# Patient Record
Sex: FEMALE | Race: WHITE | Employment: UNEMPLOYED | URBAN - METROPOLITAN AREA
[De-identification: names, ages, dates, MRNs, and addresses within clinical notes are randomized per-mention and may not be internally consistent; named-entity substitution may affect disease eponyms.]

---

## 2018-12-17 PROBLEM — O09.299 HISTORY OF PRIOR PREGNANCY WITH SHORT CERVIX, CURRENTLY PREGNANT: Status: ACTIVE | Noted: 2018-12-17

## 2018-12-17 PROBLEM — O26.899 RH NEGATIVE STATE IN ANTEPARTUM PERIOD: Status: ACTIVE | Noted: 2018-12-17

## 2018-12-17 PROBLEM — Z87.51 HISTORY OF PRETERM DELIVERY: Status: ACTIVE | Noted: 2018-12-17

## 2018-12-17 PROBLEM — Z67.91 RH NEGATIVE STATE IN ANTEPARTUM PERIOD: Status: ACTIVE | Noted: 2018-12-17

## 2018-12-17 PROBLEM — O09.529 ADVANCED MATERNAL AGE IN MULTIGRAVIDA: Status: ACTIVE | Noted: 2018-12-17

## 2019-01-17 PROBLEM — Z23 ENCOUNTER FOR IMMUNIZATION: Status: ACTIVE | Noted: 2019-01-17

## 2019-03-04 PROBLEM — O26.879 SHORT CERVIX AFFECTING PREGNANCY: Status: ACTIVE | Noted: 2019-03-04

## 2019-03-04 PROBLEM — O09.892 H/O PRETERM DELIVERY, CURRENTLY PREGNANT, SECOND TRIMESTER: Status: ACTIVE | Noted: 2018-12-17

## 2019-03-04 PROBLEM — O09.522 ELDERLY MULTIGRAVIDA IN SECOND TRIMESTER: Status: ACTIVE | Noted: 2018-12-17

## 2019-05-21 ENCOUNTER — HOSPITAL ENCOUNTER (OUTPATIENT)
Age: 39
Setting detail: OBSERVATION
LOS: 1 days | Discharge: HOME | End: 2019-05-23
Attending: OBSTETRICS & GYNECOLOGY | Admitting: OBSTETRICS & GYNECOLOGY
Payer: COMMERCIAL

## 2019-05-21 DIAGNOSIS — O09.299 HISTORY OF PRIOR PREGNANCY WITH SHORT CERVIX, CURRENTLY PREGNANT: ICD-10-CM

## 2019-05-21 DIAGNOSIS — O09.892 H/O PRETERM DELIVERY, CURRENTLY PREGNANT, SECOND TRIMESTER: ICD-10-CM

## 2019-05-21 DIAGNOSIS — E03.9 HYPOTHYROIDISM AFFECTING PREGNANCY IN THIRD TRIMESTER: ICD-10-CM

## 2019-05-21 DIAGNOSIS — O99.283 HYPOTHYROIDISM AFFECTING PREGNANCY IN THIRD TRIMESTER: ICD-10-CM

## 2019-05-21 DIAGNOSIS — O09.522 ELDERLY MULTIGRAVIDA IN SECOND TRIMESTER: ICD-10-CM

## 2019-05-21 DIAGNOSIS — O60.03 PRETERM LABOR IN THIRD TRIMESTER WITHOUT DELIVERY: ICD-10-CM

## 2019-05-21 DIAGNOSIS — Z23 ENCOUNTER FOR IMMUNIZATION: ICD-10-CM

## 2019-05-21 PROBLEM — O26.879 CERVIX, SHORT (AFFECTING PREGNANCY): Status: ACTIVE | Noted: 2019-05-21

## 2019-05-21 PROBLEM — O60.00 PRETERM LABOR: Status: ACTIVE | Noted: 2019-05-21

## 2019-05-21 LAB
A1 MICROGLOB PLACENTAL VAG QL: NEGATIVE
CONTROL LINE PRESENT?: NORMAL
EXPIRATION DATE: NORMAL
INTERNAL NEGATIVE CONTROL: NORMAL
KIT LOT NO.: NORMAL

## 2019-05-21 PROCEDURE — 99218 HC RM OBSERVATION: CPT

## 2019-05-21 PROCEDURE — 96372 THER/PROPH/DIAG INJ SC/IM: CPT

## 2019-05-21 PROCEDURE — 99285 EMERGENCY DEPT VISIT HI MDM: CPT

## 2019-05-21 PROCEDURE — 59025 FETAL NON-STRESS TEST: CPT

## 2019-05-21 PROCEDURE — 65270000029 HC RM PRIVATE

## 2019-05-21 PROCEDURE — 74011250636 HC RX REV CODE- 250/636: Performed by: OBSTETRICS & GYNECOLOGY

## 2019-05-21 PROCEDURE — 84112 EVAL AMNIOTIC FLUID PROTEIN: CPT | Performed by: OBSTETRICS & GYNECOLOGY

## 2019-05-21 RX ORDER — ZOLPIDEM TARTRATE 5 MG/1
5 TABLET ORAL
Status: DISCONTINUED | OUTPATIENT
Start: 2019-05-21 | End: 2019-05-23 | Stop reason: HOSPADM

## 2019-05-21 RX ORDER — SODIUM CHLORIDE 0.9 % (FLUSH) 0.9 %
5-40 SYRINGE (ML) INJECTION AS NEEDED
Status: DISCONTINUED | OUTPATIENT
Start: 2019-05-21 | End: 2019-05-23 | Stop reason: HOSPADM

## 2019-05-21 RX ORDER — BETAMETHASONE SODIUM PHOSPHATE AND BETAMETHASONE ACETATE 3; 3 MG/ML; MG/ML
12 INJECTION, SUSPENSION INTRA-ARTICULAR; INTRALESIONAL; INTRAMUSCULAR; SOFT TISSUE EVERY 24 HOURS
Status: COMPLETED | OUTPATIENT
Start: 2019-05-21 | End: 2019-05-22

## 2019-05-21 RX ORDER — ONDANSETRON 4 MG/1
4 TABLET, ORALLY DISINTEGRATING ORAL
Status: DISCONTINUED | OUTPATIENT
Start: 2019-05-21 | End: 2019-05-23 | Stop reason: HOSPADM

## 2019-05-21 RX ORDER — SODIUM CHLORIDE 0.9 % (FLUSH) 0.9 %
5-40 SYRINGE (ML) INJECTION EVERY 8 HOURS
Status: DISCONTINUED | OUTPATIENT
Start: 2019-05-21 | End: 2019-05-23 | Stop reason: HOSPADM

## 2019-05-21 RX ORDER — ACETAMINOPHEN 325 MG/1
650 TABLET ORAL
Status: DISCONTINUED | OUTPATIENT
Start: 2019-05-21 | End: 2019-05-23 | Stop reason: HOSPADM

## 2019-05-21 RX ADMIN — BETAMETHASONE SODIUM PHOSPHATE AND BETAMETHASONE ACETATE 12 MG: 3; 3 INJECTION, SUSPENSION INTRA-ARTICULAR; INTRALESIONAL; INTRAMUSCULAR at 11:11

## 2019-05-21 NOTE — PROGRESS NOTES
11:11 Medication Given betamethasone (CELESTONE) injection 12 mg -  Dose: 12 mg ; Route: IntraMUSCular ; Site: Left Upper Outer Quadrant ; Scheduled Time: Olimpia Corrales, Malena Mac RN

## 2019-05-21 NOTE — H&P
Chief Complaint   Patient presents with    Pregnancy Problem     30w2d       46 y.o. female  at 30w2d  weeks gestation and a chief complaint of   Chief Complaint   Patient presents with    Pregnancy Problem     30w2d    who requests evaluation for vaginal discharge and possible leakage of fluid yesterday and today. No vb or regular contractions. No uti sx. Other symptoms are: some mild cramping occasionally. Duration of symptoms:1 day    Alleviating factors: lying down  Exacerbating factors: standing--is on progesterone and had seen mfm. Also ama.   Her pregnancy issues include: short cervix    Fetal movement has beennormal .    HISTORY:  OB History    Para Term  AB Living   5 4 3 1 0 4   SAB TAB Ectopic Molar Multiple Live Births   0 0 0 0 0 4      # Outcome Date GA Lbr Pino/2nd Weight Sex Delivery Anes PTL Lv   5 Current            4 Term 03/05/15 37w2d  2.665 kg M  EPIDURAL AN N RAFAEL   3 Term 13 37w0d  2.665 kg F Vag-Spont  N RAFAEL      Birth Comments: [place]   2  02/17/10 35w0d  2.268 kg M Vag-Spont  Y RAFAEL      Birth Comments: [place]   1 Term 08 38w0d  3.09 kg F Vag-Spont  N RAFAEL      Birth Comments: [place] 20 History     Substance and Sexual Activity   Sexual Activity Yes    Partners: Male    Birth control/protection: None       Social History     Socioeconomic History    Marital status:      Spouse name: Not on file    Number of children: Not on file    Years of education: Not on file    Highest education level: Not on file   Occupational History    Not on file   Social Needs    Financial resource strain: Not on file    Food insecurity:     Worry: Not on file     Inability: Not on file    Transportation needs:     Medical: Not on file     Non-medical: Not on file   Tobacco Use    Smoking status: Never Smoker    Smokeless tobacco: Never Used   Substance and Sexual Activity    Alcohol use: No    Drug use: No    Sexual activity: Yes Partners: Male     Birth control/protection: None   Lifestyle    Physical activity:     Days per week: Not on file     Minutes per session: Not on file    Stress: Not on file   Relationships    Social connections:     Talks on phone: Not on file     Gets together: Not on file     Attends Mandaeism service: Not on file     Active member of club or organization: Not on file     Attends meetings of clubs or organizations: Not on file     Relationship status: Not on file    Intimate partner violence:     Fear of current or ex partner: Not on file     Emotionally abused: Not on file     Physically abused: Not on file     Forced sexual activity: Not on file   Other Topics Concern     Service Not Asked    Blood Transfusions Not Asked    Caffeine Concern Not Asked    Occupational Exposure Not Asked    Hobby Hazards Not Asked    Sleep Concern Not Asked    Stress Concern Not Asked    Weight Concern Not Asked    Special Diet Not Asked    Back Care Not Asked    Exercise Not Asked    Bike Helmet Not Asked    [facility] Not Asked    Self-Exams Not Asked   Social History Narrative    Not on file       Past Surgical History:   Procedure Laterality Date    HX APPENDECTOMY         Past Medical History:   Diagnosis Date    Abnormal Pap smear     x2, then normal after retested    Abnormal Papanicolaou smear of cervix     Anemia     denies hx of blood transfusions    Anxiety     GERD (gastroesophageal reflux disease)     omeprazole as needed    HX OTHER MEDICAL     appendectomy     Hypothyroidism     Intractable migraine with aura without status migrainosus 10/1/2015    Positive H. pylori test 10/1/2015     delivery     Rh negative state in antepartum period     Thyroid activity decreased          ROS:  Negative:   negative 10 point ROS except as noted in HPI    Positive:   per hpi    PHYSICAL EXAM:  Blood pressure 104/57, pulse 84, temperature 98.5 °F (36.9 °C), last menstrual period 10/21/2018, not currently breastfeeding. The patient appears well, alert, oriented x 3. Appropriate affect. Lungs are clear. Heart RRR, no murmurs. Abdomen soft, non-tender, no rebound/guarding. Fundus soft and non tender  Skin warm, dry, no rashes  Ext no edema, DTR's normal  SSE: no fluid seen, nitrazine negative  Cervix: 50%/1.5/-2    Fetal Heart Rate: Reactive  Baseline: 150 per minute  Variability: moderate  Accelerations: yes  Decelerations: none  Uterine contractions: none     Recent Results (from the past 24 hour(s))   RUPTURE OF FETAL MEMBRANES, POC    Collection Time: 05/21/19 10:15 AM   Result Value Ref Range    Rupture of fetal membrane Negative Negative    Control line present? Acceptable     Internal negative control Acceptable     Kit Lot No. [id number]     Expiration date 07/12/21        Tests performed:   UA: normal   Amniosure: negative       I have personally reviewed the patient's history, prenatal record, and pertinent test results. previous provider notes support my clinical impression. Assessment:  46 y.o. female at 30w2d  vaginal discharge, dilated cervix with  no previous exam. not ruptured. Plan:  Admit for steroids and observation. Check ffn tomorrow if stable prior to d/c. Discussed with kalpesh bee and mandi.      Signed By:  Ingrid Anderson MD     May 21, 2019

## 2019-05-21 NOTE — PROGRESS NOTES
Pt to [facility] with c/o vaginal leaking of fluid. Denied vaginal bleeding and contractions. Reports she has a cold and she thinks the leaking happened when coughing. + FM noted.  Placed on EFM and toco.

## 2019-05-22 LAB — FIBRONECTIN FETAL VAG QL: NEGATIVE

## 2019-05-22 PROCEDURE — 96372 THER/PROPH/DIAG INJ SC/IM: CPT

## 2019-05-22 PROCEDURE — 74011250636 HC RX REV CODE- 250/636: Performed by: OBSTETRICS & GYNECOLOGY

## 2019-05-22 PROCEDURE — 59025 FETAL NON-STRESS TEST: CPT

## 2019-05-22 PROCEDURE — 82731 ASSAY OF FETAL FIBRONECTIN: CPT

## 2019-05-22 PROCEDURE — 99218 HC RM OBSERVATION: CPT

## 2019-05-22 PROCEDURE — 59025 FETAL NON-STRESS TEST: CPT | Performed by: OBSTETRICS & GYNECOLOGY

## 2019-05-22 RX ADMIN — BETAMETHASONE SODIUM PHOSPHATE AND BETAMETHASONE ACETATE 12 MG: 3; 3 INJECTION, SUSPENSION INTRA-ARTICULAR; INTRALESIONAL; INTRAMUSCULAR at 11:14

## 2019-05-22 NOTE — PROGRESS NOTES
JOAQUINN collected and sent to lab. SVE 3/50/-2. Dr. Maame Collado made aware and discharge cancelled. Orders to continue contraction monitoring.

## 2019-05-22 NOTE — DISCHARGE SUMMARY
[facility] [address] [place] [address] Discharge Summary     Patient ID:  Taj Gonzalez  [id number]  75 y.o.  1980    Admit date: 2019    Discharge date and time: No discharge date for patient encounter. Admitting Physician: Vitaly Garza MD     Discharge Physician: Jeanette Dexter M.D. Admission Diagnoses: Cervix, short (affecting pregnancy) [O26.879]    Problem List: Hospital - Principal Problem:     labor (2019)    Active Problems:    Cervix, short (affecting pregnancy) (2019)     ; Other -   Patient Active Problem List   Diagnosis Code    Hypothyroidism affecting pregnancy in second trimester O99.282, E03.9    H/O  delivery, currently pregnant, second trimester O09.212    History of prior pregnancy with short cervix, currently pregnant O09.299    Elderly multigravida in second trimester O09.522    Rh negative state in antepartum period O26.899, Z67.91    Encounter for immunization Z23    Short cervix affecting pregnancy O26.879    Cervix, short (affecting pregnancy) O26.879     labor O60.00        Discharge Diagnoses: Cervix, short (affecting pregnancy) [O26.879]    Hospital Course: Taj Gonzalez had unremarkeable progressive recovery. , Eating, ambulating, and voiding in a routine manner. and Hospital course complicated by PTL    Significant Diagnostic Studies: No results found for this or any previous visit (from the past 24 hour(s)). Procedures: tocolysis, steroid administration    Discharge Exam:  Visit Vitals  /62 (BP 1 Location: Right arm, BP Patient Position: At rest)   Pulse 89   Temp 97.9 °F (36.6 °C)   Resp 18   LMP 10/21/2018   Breastfeeding? No        Heart: regular rate and rhythm, S1, S2 normal, no murmur, click, rub or gallop  Lungs:clear to auscultation bilaterally  Extremities: normal, atraumatic, no cyanosis or edema.  No DVT  Ffn done today before d/c  Cerivcal exam no change  Patient Instructions:   Current Discharge Medication List CONTINUE these medications which have NOT CHANGED    Details   levothyroxine (SYNTHROID) 75 mcg tablet TAKE 1 TABLET BY MOUTH DAILY BEFORE BREAKFAST  Qty: 90 Tab, Refills: 1    Associated Diagnoses: Hypothyroidism due to acquired atrophy of thyroid      aspirin delayed-release 81 mg tablet Take  by mouth daily. calcium carbonate-mag hydroxid 1,000-200 mg chew Take  by mouth. cholecalciferol, vitamin D3, (VITAMIN D3 PO) Take  by mouth. PNV No12-Iron-FA-DSS-OM-3 29 mg iron-1 mg -50 mg CPKD Take  by mouth. HEMOCYTE-PLUS 106 mg iron- 1 mg cap TAKE ONE CAPSULE BY MOUTH DAILY  Qty: 90 Cap, Refills: 3    Associated Diagnoses: Iron deficiency anemia, unspecified iron deficiency anemia type      omeprazole (PRILOSEC) 20 mg capsule Take 20 mg by mouth daily.           EFM no deceld  Activity: physical activity is restricted per discharge instructions  Diet: resume normal diet      Follow-up with Ed[facility] in 1 week  Signed:  Bill Carballo MD  5/22/2019  1:40 PM

## 2019-05-22 NOTE — PROGRESS NOTES
11:14 Medication Given betamethasone (CELESTONE) injection 12 mg -  Dose: 12 mg ; Route: IntraMUSCular ; Site: Left Upper Outer Quadrant ; Scheduled Time: Olimpia Corrales, Lula Carrillo RN

## 2019-05-22 NOTE — PROGRESS NOTES
Shift assessment completed. Vitals wnl, see flowsheet. Pt denies any pain at this time, pt states that she had small amount of mucousy discharge last time she went to the bathroom, but denies any uc's or pain. Pt eating dinner, will do NST after dinner. Pt verbalizes understanding.

## 2019-05-22 NOTE — PROGRESS NOTES
Shift assessment complete no complaints denies any contractions at this time reports good fetal movement.

## 2019-05-22 NOTE — PROGRESS NOTES
05/22/19 1105   Fetal Vital Signs   Mode External   Fetal Heart Rate 120   Fetal Activity Present   Variability 6-25 BPM   Decelerations None   Accelerations Yes   RN Reviewed Strip?  Yes   Non Stress Test Reactive  (laine for GA)   Uterine Activity   Mode External   Frequency (min) 0

## 2019-05-22 NOTE — PROGRESS NOTES
AM assessment complete at this time per flow sheet. Vital signs stable. No contractions tracing on the monitor. Patient denies pain,  nausea and vomiting, contractions, vaginal bleeding, vaginal leaking of fluid and visual disturbances. Patient states positive fetal movement. Encouraged to call as needed. Family at bedside.

## 2019-05-23 VITALS
DIASTOLIC BLOOD PRESSURE: 55 MMHG | TEMPERATURE: 98 F | HEART RATE: 81 BPM | SYSTOLIC BLOOD PRESSURE: 121 MMHG | RESPIRATION RATE: 18 BRPM

## 2019-05-23 PROCEDURE — 99218 HC RM OBSERVATION: CPT

## 2019-05-23 PROCEDURE — 99215 OFFICE O/P EST HI 40 MIN: CPT | Performed by: OBSTETRICS & GYNECOLOGY

## 2019-05-23 PROCEDURE — 74011250637 HC RX REV CODE- 250/637: Performed by: OBSTETRICS & GYNECOLOGY

## 2019-05-23 PROCEDURE — 59025 FETAL NON-STRESS TEST: CPT | Performed by: OBSTETRICS & GYNECOLOGY

## 2019-05-23 PROCEDURE — 59025 FETAL NON-STRESS TEST: CPT

## 2019-05-23 RX ORDER — GUAIFENESIN/DEXTROMETHORPHAN 100-10MG/5
5 SYRUP ORAL
Status: DISCONTINUED | OUTPATIENT
Start: 2019-05-23 | End: 2019-05-23 | Stop reason: HOSPADM

## 2019-05-23 RX ORDER — GUAIFENESIN/DEXTROMETHORPHAN 100-10MG/5
SYRUP ORAL
Status: DISCONTINUED
Start: 2019-05-23 | End: 2019-05-23 | Stop reason: HOSPADM

## 2019-05-23 RX ADMIN — GUAIFENESIN AND DEXTROMETHORPHAN 5 ML: 100; 10 SYRUP ORAL at 01:14

## 2019-05-23 NOTE — DISCHARGE INSTRUCTIONS
Patient Education        Pregnancy Precautions: Care Instructions  Your Care Instructions    There is no sure way to prevent labor before your due date ( labor) or to prevent most other pregnancy problems. But there are things you can do to increase your chances of a healthy pregnancy. Go to your appointments, follow your doctor's advice, and take good care of yourself. Eat well, and exercise (if your doctor agrees). And make sure to drink plenty of water. Follow-up care is a key part of your treatment and safety. Be sure to make and go to all appointments, and call your doctor if you are having problems. It's also a good idea to know your test results and keep a list of the medicines you take. How can you care for yourself at home? · Make sure you go to your prenatal appointments. At each visit, your doctor will check your blood pressure. Your doctor will also check to see if you have protein in your urine. High blood pressure and protein in urine are signs of preeclampsia. This condition can be dangerous for you and your baby. · Drink plenty of fluids, enough so that your urine is light yellow or clear like water. Dehydration can cause contractions. If you have kidney, heart, or liver disease and have to limit fluids, talk with your doctor before you increase the amount of fluids you drink. · Tell your doctor right away if you notice any symptoms of an infection, such as:  ? Burning when you urinate. ? A foul-smelling discharge from your vagina. ? Vaginal itching. ? Unexplained fever. ? Unusual pain or soreness in your uterus or lower belly. · Eat a balanced diet. Include plenty of foods that are high in calcium and iron. ? Foods high in calcium include milk, cheese, yogurt, almonds, and broccoli. ? Foods high in iron include red meat, shellfish, poultry, eggs, beans, raisins, whole-grain bread, and leafy green vegetables. · Do not smoke.  If you need help quitting, talk to your doctor about stop-smoking programs and medicines. These can increase your chances of quitting for good. · Do not drink alcohol or use illegal drugs. · Follow your doctor's directions about activity. Your doctor will let you know how much, if any, exercise you can do. · Ask your doctor if you can have sex. If you are at risk for early labor, your doctor may ask you to not have sex. · Take care to prevent falls. During pregnancy, your joints are loose, and your balance is off. Sports such as bicycling, skiing, or in-line skating can increase your risk of falling. And don't ride horses or motorcycles, dive, water ski, scuba dive, or parachute jump while you are pregnant. · Avoid getting very hot. Do not use saunas or hot tubs. Avoid staying out in the sun in hot weather for long periods. Take acetaminophen (Tylenol) to lower a high fever. · Do not take any over-the-counter or herbal medicines or supplements without talking to your doctor or pharmacist first.  When should you call for help? Call 911 anytime you think you may need emergency care. For example, call if:    · You passed out (lost consciousness).     · You have severe vaginal bleeding.     · You have severe pain in your belly or pelvis.     · You have had fluid gushing or leaking from your vagina and you know or think the umbilical cord is bulging into your vagina. If this happens, immediately get down on your knees so your rear end (buttocks) is higher than your head. This will decrease the pressure on the cord until help arrives.   [facility] your doctor now or seek immediate medical care if:    · You have signs of preeclampsia, such as:  ? Sudden swelling of your face, hands, or feet. ? New vision problems (such as dimness or blurring). ? A severe headache.     · You have any vaginal bleeding.     · You have belly pain or cramping.     · You have a fever.     · You have had regular contractions (with or without pain) for an hour.  This means that you have 8 or more within 1 hour or 4 or more in 20 minutes after you change your position and drink fluids.     · You have a sudden release of fluid from your vagina.     · You have low back pain or pelvic pressure that does not go away.     · You notice that your baby has stopped moving or is moving much less than normal.    Watch closely for changes in your health, and be sure to contact your doctor if you have any problems. Where can you learn more? Go to http://dann-avtar.info/. Enter [id number] in the search box to learn more about \"Pregnancy Precautions: Care Instructions. \"  Current as of: September 5, 2018  Content Version: 11.9  © 4850-3598 [employer], [employer]. Care instructions adapted under license by [employer] (which disclaims liability or warranty for this information). If you have questions about a medical condition or this instruction, always ask your healthcare professional. [address] any warranty or liability for your use of this information.

## 2019-05-23 NOTE — PROGRESS NOTES
05/23/19 1208   Fetal Vital Signs   Mode External   Fetal Heart Rate 130   Fetal Activity Present   Variability 6-25 BPM   Decelerations None   Accelerations Yes   RN Reviewed Strip?  Yes   Non Stress Test Reactive   Uterine Activity   Mode External   Frequency (min) 0

## 2019-05-23 NOTE — PROCEDURES
NST: 5/22/19 pm  · Indications:   PTL  · Time On:  2108  · Time Off:  2130  · Results:  Reactive  · FHR Baseline Rate:  125  · Periodic Changes:   Mod brandie, no decels  · Comments:  No uterine activity  Repeat:  As clinically indicated

## 2019-05-23 NOTE — PROGRESS NOTES
Pt resting in bed on C-TOCO. No contractions seen and pt also denied having any contractions. +FM. AM assessment complete. VSS. Deneid LOF, vaginal bleeding, headache, N/V. Pt report feeling fine. MFM to consult today. Encouraged to call with needs. Family at bedside.

## 2019-05-23 NOTE — CONSULTS
Maternal Fetal Medicine Consult Note    Requesting KIA Collado    Chief Complaint:  Pregnancy and vaginal leaking of fluid . History of Present Illness: 46 y.o.  at 30w4d by Estimated Date of Delivery: 19. Patient admitted for cervical change with history of  delivery. She complains of 2 episodes of clear LOF not associated with contractions. No VB. +FM. Negative work up for PPROM on admission. Since admission 2 episodes of small clear discharge. Patient denies HA, abdominal pain, vision changes. No regular contractions, large LOF, VB. Good FM. Minimal edema. No chest pain or shortness of breath. No significant reflux, nausea, constipation, or other GI complaints. Initially 1-2cm/50% on admission. Called 3cm/50% yesterday. No ctx ever noted on toco, irritability initially with admission. None of significance since. Review of Systems: A comprehensive review of systems was negative except for that written in the HPI.      OB History    Para Term  AB Living   5 4 3 1 0 4   SAB TAB Ectopic Molar Multiple Live Births   0 0 0 0 0 4      # Outcome Date GA Lbr Pino/2nd Weight Sex Delivery Anes PTL Lv   5 Current            4 Term 03/05/15 37w2d  5 lb 14 oz (2.665 kg) M  EPIDURAL AN N RAFAEL   3 Term 13 37w0d  5 lb 14 oz (2.665 kg) F Vag-Spont  N RAFAEL      Birth Comments: [place]   2  02/17/10 35w0d  5 lb (2.268 kg) M Vag-Spont  Y RAFAEL      Birth Comments: [place]   1 Term 08 38w0d  6 lb 13 oz (3.09 kg) F Vag-Spont  N RAFAEL      Birth Comments: [place]       Past Surgical History:   Procedure Laterality Date    HX APPENDECTOMY       Past Medical History:   Diagnosis Date    Abnormal Pap smear     x2, then normal after retested    Abnormal Papanicolaou smear of cervix     Anemia     denies hx of blood transfusions    Anxiety     GERD (gastroesophageal reflux disease)     omeprazole as needed    HX OTHER MEDICAL     appendectomy     Hypothyroidism     Intractable migraine with aura without status migrainosus 10/1/2015    Positive H. pylori test 10/1/2015     delivery      labor 2019    Rh negative state in antepartum period     Thyroid activity decreased      Family History   Problem Relation Age of Onset    No Known Problems Mother     Hypertension Father     Elevated Lipids Father     Cancer Maternal Grandmother         ovarian     No Known Allergies  Current Facility-Administered Medications   Medication Dose Route Frequency    guaiFENesin-dextromethorphan (ROBITUSSIN DM) 100-10 mg/5 mL syrup 5 mL  5 mL Oral Q6H PRN    guaiFENesin-dextromethorphan (ROBITUSSIN DM) 100-10 mg/5 mL syrup        sodium chloride (NS) flush 5-40 mL  5-40 mL IntraVENous Q8H    sodium chloride (NS) flush 5-40 mL  5-40 mL IntraVENous PRN    zolpidem (AMBIEN) tablet 5 mg  5 mg Oral QHS PRN    ondansetron (ZOFRAN ODT) tablet 4 mg  4 mg Oral Q8H PRN    acetaminophen (TYLENOL) tablet 650 mg  650 mg Oral Q8H PRN     Social History     Socioeconomic History    Marital status:      Spouse name: Not on file    Number of children: Not on file    Years of education: Not on file    Highest education level: Not on file   Occupational History    Not on file   Social Needs    Financial resource strain: Not on file    Food insecurity:     Worry: Not on file     Inability: Not on file    Transportation needs:     Medical: Not on file     Non-medical: Not on file   Tobacco Use    Smoking status: Never Smoker    Smokeless tobacco: Never Used   Substance and Sexual Activity    Alcohol use: No    Drug use: No    Sexual activity: Yes     Partners: Male     Birth control/protection: None   Lifestyle    Physical activity:     Days per week: Not on file     Minutes per session: Not on file    Stress: Not on file   Relationships    Social connections:     Talks on phone: Not on file     Gets together: Not on file     Attends Christian service: Not on file     Active member of club or organization: Not on file     Attends meetings of clubs or organizations: Not on file     Relationship status: Not on file    Intimate partner violence:     Fear of current or ex partner: Not on file     Emotionally abused: Not on file     Physically abused: Not on file     Forced sexual activity: Not on file   Other Topics Concern     Service Not Asked    Blood Transfusions Not Asked    Caffeine Concern Not Asked    Occupational Exposure Not Asked    Hobby Hazards Not Asked    Sleep Concern Not Asked    Stress Concern Not Asked    Weight Concern Not Asked    Special Diet Not Asked    Back Care Not Asked    Exercise Not Asked    Bike Helmet Not Asked    [address] Not Asked    Self-Exams Not Asked   Social History Narrative    Not on file         Current Facility-Administered Medications:     guaiFENesin-dextromethorphan (ROBITUSSIN DM) 100-10 mg/5 mL syrup 5 mL, 5 mL, Oral, Q6H PRN, Alt, Teresa K, DO, 5 mL at 19 0114    guaiFENesin-dextromethorphan (ROBITUSSIN DM) 100-10 mg/5 mL syrup, , , ,     sodium chloride (NS) flush 5-40 mL, 5-40 mL, IntraVENous, Q8H, Agnieszka FERNÁNDEZ MD    sodium chloride (NS) flush 5-40 mL, 5-40 mL, IntraVENous, PRN, Agnieszka FERNÁNDEZ MD    zolpidem (AMBIEN) tablet 5 mg, 5 mg, Oral, QHS PRN, Valery Martino MD    ondansetron (ZOFRAN ODT) tablet 4 mg, 4 mg, Oral, Q8H PRN, Valery Martino MD    acetaminophen (TYLENOL) tablet 650 mg, 650 mg, Oral, Q8H PRN, Alt, Teresa K, DO    Vitals:    Patient Vitals for the past 24 hrs:   BP   19 0846 121/55   19 2356 104/60   19 1926 115/58   19 1924 115/58   19 1400 111/59     Temp (24hrs), Av.2 °F (36.8 °C), Min:97.6 °F (36.4 °C), Max:99.1 °F (37.3 °C)      I&O:  No intake/output data recorded. No intake/output data recorded. Exam:    Patient without distress.   Lungs: CTAB  Heart:  regular rate and rhythm, ILEANA     Abdomen: soft, non-tender. Fundus: soft and non tender  Right Upper Quadrant: non-tender    Genitourinary: No sign of blood or amniotic fluid, external genitalia normal  Cervical Exam: Dilation (cm): 2 Eff: 50 % (Based on description of prior exams, this is same exam as both prior during this admission)  Membranes: Membrane Status: Intact    Lower Extremity Edema: No    Skin: normal coloration and turgor, no rashes, no suspicious skin lesions noted. Neurologic: negative  Psychiatric: non focal      Maternal and Fetal Monitoring:                              Uterine Activity: Frequency (min): none                           Fetal Heart Rate: Mode: ExternalFetal Heart Rate: 120      NST:  · Indications:   PTL  · Time On:  1132  · Time Off:  1213  · Results:  Reactive  · FHR Baseline Rate:  135  · Periodic Changes: Mod variability, no decels. · Comments:  No contractions or significant uterine activity  Repeat:  As clinically indicated      Labs:   CBC:    Recent Labs     05/10/19  1059 18  1115 18   WBC  --  6.3  --    HGB 12.3 13.2  --    HCT  --  38.2  --    PLT  --  246  --    HGBEXT  --   --  13.2   HCTEXT  --   --  38.2   PLTEXT  --   --  246       CMP:   Recent Labs     10/02/18  1206      K 3.8      CO2 25   GLU 80   BUN 10   CREA 0.92   GFRAA 92   GFRNA 80   CA 9.2   ALB 5.0   TP 7.2   AGRAT 2.3*   SGOT 19   ALT 16     Recent Glucose Results: Recent Glucose Results:   Recent Labs     10/02/18  1206   GLU 80       Prenatal Labs:    Lab Results   Component Value Date/Time    Rubella, External immune 2018    HBsAg, External negative 2018    HIV, External NR 2018    RPR, External NR 2018    Gonorrhea, External negative 2019    Chlamydia, External negative 2019       Assessment and Plan:   46 y.o.   at 30w4d with      Patient Active Problem List    Diagnosis Date Noted     labor 2019     Priority: 1 - One    Short cervix affecting pregnancy 2019     Priority: 2 - Two    H/O  delivery, currently pregnant, second trimester 2018     Priority: 2 - Two    History of prior pregnancy with short cervix, currently pregnant 2018     Priority: 2 - Two    Elderly multigravida in second trimester 2018     Priority: 3 - Three    Hypothyroidism affecting pregnancy in third trimester 2016     Priority: 4 - Four    Rh negative state in antepartum period 2018     Priority: 5 - Five    Encounter for immunization 2019        Labor- Patient stable at this time. Pt without consistent contractions. No cervical change since admission. No signs of PPROM  S/P  steroids. Next 17-OHP injection on . Will plan dc home with strict PTL precautions      Time:90    Minutes spent on floor,with greater than 50% of the time examining patient, explaining plan and coordinating care with nurse and requesting primary physician.       Claude Dillon MD

## 2019-05-23 NOTE — PROGRESS NOTES
Cardio removed, strip reactive and reviewed with Jadon MURILLO. [place] continuing to trace. Pt denies any uc's or pain.

## 2019-05-23 NOTE — PROGRESS NOTES
Chart reviewed on 5/22 with no needs identified. Discharge was scheduled for 5/22 and then cancelled last night. Patient remains with no needs at this time. SW will continue to follow and will remain available as needed.     TELLY Cabezas  [address] [place] [address]   [phone]

## 2019-06-28 ENCOUNTER — HOSPITAL ENCOUNTER (OUTPATIENT)
Age: 39
Discharge: HOME | End: 2019-06-28
Attending: OBSTETRICS & GYNECOLOGY | Admitting: OBSTETRICS & GYNECOLOGY
Payer: COMMERCIAL

## 2019-06-28 VITALS
BODY MASS INDEX: 30.31 KG/M2 | DIASTOLIC BLOOD PRESSURE: 63 MMHG | WEIGHT: 200 LBS | HEIGHT: 68 IN | SYSTOLIC BLOOD PRESSURE: 99 MMHG | RESPIRATION RATE: 18 BRPM | TEMPERATURE: 98.4 F | HEART RATE: 77 BPM

## 2019-06-28 PROBLEM — O26.893 VAGINAL DISCHARGE DURING PREGNANCY IN THIRD TRIMESTER: Status: ACTIVE | Noted: 2019-06-28

## 2019-06-28 PROBLEM — O09.523 ADVANCED MATERNAL AGE IN MULTIGRAVIDA, THIRD TRIMESTER: Status: ACTIVE | Noted: 2019-06-28

## 2019-06-28 PROBLEM — N89.8 VAGINAL DISCHARGE DURING PREGNANCY IN THIRD TRIMESTER: Status: ACTIVE | Noted: 2019-06-28

## 2019-06-28 LAB
A1 MICROGLOB PLACENTAL VAG QL: NEGATIVE
CONTROL LINE PRESENT?: NORMAL
EXPIRATION DATE: NORMAL
GLUCOSE, GLUUPC: NEGATIVE
INTERNAL NEGATIVE CONTROL: NORMAL
KETONES UR-MCNC: NEGATIVE MG/DL
KIT LOT NO.: NORMAL
PROT UR QL: NEGATIVE

## 2019-06-28 PROCEDURE — 81002 URINALYSIS NONAUTO W/O SCOPE: CPT | Performed by: OBSTETRICS & GYNECOLOGY

## 2019-06-28 PROCEDURE — 99285 EMERGENCY DEPT VISIT HI MDM: CPT

## 2019-06-28 PROCEDURE — 59025 FETAL NON-STRESS TEST: CPT

## 2019-06-28 PROCEDURE — 84112 EVAL AMNIOTIC FLUID PROTEIN: CPT | Performed by: OBSTETRICS & GYNECOLOGY

## 2019-06-28 NOTE — ED PROVIDER NOTES
Chief Complaint: leaking small amounts of fluid and contractions every 20 minutes or so      46 y.o. female [id number] at 35w5d  weeks gestation who is seen for vag leaking of fluid and  contractions. Pt reports small amounts of clear fluid and mild contractions every 20 minutes or so. No vag bleeding . Good fetal movement. No other c/o        HISTORY:    Social History     Substance and Sexual Activity   Sexual Activity Yes    Partners: Male    Birth control/protection: None     Patient's last menstrual period was 10/21/2018.     Social History     Socioeconomic History    Marital status:      Spouse name: Not on file    Number of children: Not on file    Years of education: Not on file    Highest education level: Not on file   Occupational History    Not on file   Social Needs    Financial resource strain: Not on file    Food insecurity:     Worry: Not on file     Inability: Not on file    Transportation needs:     Medical: Not on file     Non-medical: Not on file   Tobacco Use    Smoking status: Never Smoker    Smokeless tobacco: Never Used   Substance and Sexual Activity    Alcohol use: No    Drug use: No    Sexual activity: Yes     Partners: Male     Birth control/protection: None   Lifestyle    Physical activity:     Days per week: Not on file     Minutes per session: Not on file    Stress: Not on file   Relationships    Social connections:     Talks on phone: Not on file     Gets together: Not on file     Attends Yazdanism service: Not on file     Active member of club or organization: Not on file     Attends meetings of clubs or organizations: Not on file     Relationship status: Not on file    Intimate partner violence:     Fear of current or ex partner: Not on file     Emotionally abused: Not on file     Physically abused: Not on file     Forced sexual activity: Not on file   Other Topics Concern   [employer] Service Not Asked    Blood Transfusions Not Asked    Caffeine Concern Not Asked    Occupational Exposure Not Asked    Hobby Hazards Not Asked    Sleep Concern Not Asked    Stress Concern Not Asked    Weight Concern Not Asked    Special Diet Not Asked    Back Care Not Asked    Exercise Not Asked    Bike Helmet Not Asked   [place] Not Asked    Self-Exams Not Asked   Social History Narrative    Not on file       Past Surgical History:   Procedure Laterality Date    HX APPENDECTOMY         Past Medical History:   Diagnosis Date    Abnormal Pap smear     x2, then normal after retested    Abnormal Papanicolaou smear of cervix     Anemia     denies hx of blood transfusions    Anxiety     GERD (gastroesophageal reflux disease)     omeprazole as needed    HX OTHER MEDICAL     appendectomy     Hypothyroidism     Intractable migraine with aura without status migrainosus 10/1/2015    Positive H. pylori test 10/1/2015     delivery      labor 2019    Rh negative state in antepartum period     Thyroid activity decreased          ROS:  A 12 point review of symptoms negative except for chief complaint as described above. PHYSICAL EXAM:  Blood pressure 99/63, pulse 77, temperature 98.4 °F (36.9 °C), resp. rate 18, height 5' 8\" (1.727 m), weight 90.7 kg (200 lb), last menstrual period 10/21/2018. Constitutional: The patient appears well, alert, oriented x 3. Cardiovascular: Heart RRR, no murmurs.    Respiratory: Lungs clear, no respiratory distress  GI: Abdomen soft, nontender, no guarding  No fundal tenderness  Musculoskeletal: no cva tenderness  Upper ext: no edema, reflexes +2  Lower ext: no edema, neg raine's, reflexes +2  Skin: no rashes or lesions  Psychiatric:Mood/ Affect: appropriate  Genitourinary: SVE: 4/70/-2  FHT:reactive, cat 1  TOCO:no contractions ( and pt not feelign any since arrival)  amnisure - neg    I personally reviewed pt's medical record including relevant labs and ultrasounds  I reviewed the NST at today's encounter    Assessment/Plan:  45 yo  at 35w5d   Reactive nst  amnisure and exam - neg  Cervical change since last exam but no current contractions- pt is s/p  steroids at around 30 weeks  Home with precautions  Encourage hydration

## 2019-06-28 NOTE — PROGRESS NOTES
Dr Wilson Roberts here SVE 4/70/-2 Amnisure done. Patient presents with complaints of leaking fluid and irregular contractions.

## 2019-06-28 NOTE — DISCHARGE INSTRUCTIONS
Patient Education   Patient Education        Counting Your Baby's Kicks: Care Instructions  Your Care Instructions    Counting your baby's kicks is one way your doctor can tell that your baby is healthy. Most women--especially in a first pregnancy--feel their baby move for the first time between 16 and 22 weeks. The movement may feel like flutters rather than kicks. Your baby may move more at certain times of the day. When you are active, you may notice less kicking than when you are resting. At your prenatal visits, your doctor will ask whether the baby is active. In your last trimester, your doctor may ask you to count the number of times you feel your baby move. Follow-up care is a key part of your treatment and safety. Be sure to make and go to all appointments, and call your doctor if you are having problems. It's also a good idea to know your test results and keep a list of the medicines you take. How do you count fetal kicks? · A common method of checking your baby's movement is to count the number of kicks or moves you feel in 1 hour. Ten movements (such as kicks, flutters, or rolls) in 1 hour are normal. Some doctors suggest that you count in the morning until you get to 10 movements. Then you can quit for that day and start again the next day. · Pick your baby's most active time of day to count. This may be any time from morning to evening. · If you do not feel 10 movements in an hour, your baby may be sleeping. Wait for the next hour and count again. When should you call for help? Call your doctor now or seek immediate medical care if:    · You noticed that your baby has stopped moving or is moving much less than normal.    Watch closely for changes in your health, and be sure to contact your doctor if you have any problems. Where can you learn more? Go to http://dann-avtar.info/.   Enter K597 in the search box to learn more about \"Counting Your Baby's Kicks: Care Instructions. \"  Current as of: 2018  Content Version: 11.9  © 5977-0429 [employer]. Care instructions adapted under license by [employer] (which disclaims liability or warranty for this information). If you have questions about a medical condition or this instruction, always ask your healthcare professional. [address] any warranty or liability for your use of this information.  Labor: Care Instructions  Your Care Instructions     labor is the start of labor between 21 and 36 weeks of pregnancy. A full-term pregnancy lasts 37 to 42 weeks. In labor, the uterus contracts to open the cervix. This is the first stage of childbirth.  labor can be caused by a problem with the baby, the mother, or both. Often the cause is not known. In some cases, doctors use medicines to try to delay labor until 29 or more weeks of pregnancy. By this time, a baby has grown enough so that problems are not likely. In some cases--such as with a serious infection--it is healthier for the baby to be born early. Your treatment will depend on how far along you are in your pregnancy and on your health and your baby's health. Follow-up care is a key part of your treatment and safety. Be sure to make and go to all appointments, and call your doctor if you are having problems. It's also a good idea to know your test results and keep a list of the medicines you take. How can you care for yourself at home? · If your doctor prescribed medicines, take them exactly as directed. Call your doctor if you think you are having a problem with your medicine. · Rest until your doctor advises you about activity. He or she will tell you if you should stay in bed most of the time. You may need to arrange for  if you have young children. · Do not have sexual intercourse unless your doctor says it is safe.   · Use pads, not tampons, if you have vaginal bleeding. · Make sure to drink plenty of fluids. Dehydration can lead to contractions. If you have kidney, heart, or liver disease and have to limit fluids, talk with your doctor before you increase the amount of fluids you drink. · Do not smoke or allow others to smoke around you. If you need help quitting, talk to your doctor about stop-smoking programs and medicines. These can increase your chances of quitting for good. When should you call for help? Call 911 anytime you think you may need emergency care. For example, call if:    · You passed out (lost consciousness).     · You have severe vaginal bleeding.     · You have severe pain in your belly or pelvis.     · You have had fluid gushing or leaking from your vagina and you know or think the umbilical cord is bulging into your vagina. If this happens, immediately get down on your knees so your rear end (buttocks) is higher than your head. This will decrease the pressure on the cord until help arrives.   [facility] your doctor now or seek immediate medical care if:    · You have signs of preeclampsia, such as:  ? Sudden swelling of your face, hands, or feet. ? New vision problems (such as dimness or blurring). ? A severe headache.     · You have any vaginal bleeding.     · You have belly pain or cramping.     · You have a fever.     · You have had regular contractions (with or without pain) for an hour. This means that you have 6 or more within 1 hour after you change your position and drink fluids.     · You have a sudden release of fluid from the vagina.     · You have low back pain or pelvic pressure that does not go away.     · You notice that your baby has stopped moving or is moving much less than normal.    Watch closely for changes in your health, and be sure to contact your doctor if you have any problems. Where can you learn more? Go to http://dann-avtar.info/.   Enter Q400 in the search box to learn more about \" Labor: Care Instructions. \"  Current as of: September 5, 2018  Content Version: 11.9  © 6188-3486 [employer]. Care instructions adapted under license by [employer] (which disclaims liability or warranty for this information). If you have questions about a medical condition or this instruction, always ask your healthcare professional. [place] [address] any warranty or liability for your use of this information.

## 2019-06-29 NOTE — PROGRESS NOTES
Patient discharged to home in stable condition with PTL precautions and kick counts.   Patient and patients mother verbalize understanding of these

## 2019-07-07 ENCOUNTER — HOSPITAL ENCOUNTER (EMERGENCY)
Age: 39
LOS: 1 days | Discharge: HOME | DRG: 566 | End: 2019-07-07
Attending: OBSTETRICS & GYNECOLOGY | Admitting: OBSTETRICS & GYNECOLOGY
Payer: COMMERCIAL

## 2019-07-07 VITALS
HEART RATE: 90 BPM | TEMPERATURE: 97.7 F | HEIGHT: 68 IN | RESPIRATION RATE: 16 BRPM | DIASTOLIC BLOOD PRESSURE: 66 MMHG | WEIGHT: 202 LBS | BODY MASS INDEX: 30.62 KG/M2 | SYSTOLIC BLOOD PRESSURE: 107 MMHG

## 2019-07-07 PROBLEM — O47.03 PRETERM UTERINE CONTRACTIONS IN THIRD TRIMESTER, ANTEPARTUM: Status: ACTIVE | Noted: 2019-07-07

## 2019-07-07 PROCEDURE — 99283 EMERGENCY DEPT VISIT LOW MDM: CPT | Performed by: OBSTETRICS & GYNECOLOGY

## 2019-07-07 PROCEDURE — 59025 FETAL NON-STRESS TEST: CPT | Performed by: OBSTETRICS & GYNECOLOGY

## 2019-07-07 PROCEDURE — 65270000029 HC RM PRIVATE

## 2019-07-07 PROCEDURE — 59025 FETAL NON-STRESS TEST: CPT

## 2019-07-07 NOTE — PROGRESS NOTES
Pt arrived to [facility] with complaints of contractions. Assessment complete. Dr. Fredo Weaver in room to assess pt. SVE 6.

## 2019-07-07 NOTE — DISCHARGE INSTRUCTIONS
Patient Education        Pregnancy Precautions: Care Instructions  Your Care Instructions    There is no sure way to prevent labor before your due date ( labor) or to prevent most other pregnancy problems. But there are things you can do to increase your chances of a healthy pregnancy. Go to your appointments, follow your doctor's advice, and take good care of yourself. Eat well, and exercise (if your doctor agrees). And make sure to drink plenty of water. Follow-up care is a key part of your treatment and safety. Be sure to make and go to all appointments, and call your doctor if you are having problems. It's also a good idea to know your test results and keep a list of the medicines you take. How can you care for yourself at home? · Make sure you go to your prenatal appointments. At each visit, your doctor will check your blood pressure. Your doctor will also check to see if you have protein in your urine. High blood pressure and protein in urine are signs of preeclampsia. This condition can be dangerous for you and your baby. · Drink plenty of fluids, enough so that your urine is light yellow or clear like water. Dehydration can cause contractions. If you have kidney, heart, or liver disease and have to limit fluids, talk with your doctor before you increase the amount of fluids you drink. · Tell your doctor right away if you notice any symptoms of an infection, such as:  ? Burning when you urinate. ? A foul-smelling discharge from your vagina. ? Vaginal itching. ? Unexplained fever. ? Unusual pain or soreness in your uterus or lower belly. · Eat a balanced diet. Include plenty of foods that are high in calcium and iron. ? Foods high in calcium include milk, cheese, yogurt, almonds, and broccoli. ? Foods high in iron include red meat, shellfish, poultry, eggs, beans, raisins, whole-grain bread, and leafy green vegetables. · Do not smoke.  If you need help quitting, talk to your doctor about stop-smoking programs and medicines. These can increase your chances of quitting for good. · Do not drink alcohol or use illegal drugs. · Follow your doctor's directions about activity. Your doctor will let you know how much, if any, exercise you can do. · Ask your doctor if you can have sex. If you are at risk for early labor, your doctor may ask you to not have sex. · Take care to prevent falls. During pregnancy, your joints are loose, and your balance is off. Sports such as bicycling, skiing, or in-line skating can increase your risk of falling. And don't ride horses or motorcycles, dive, water ski, scuba dive, or parachute jump while you are pregnant. · Avoid getting very hot. Do not use saunas or hot tubs. Avoid staying out in the sun in hot weather for long periods. Take acetaminophen (Tylenol) to lower a high fever. · Do not take any over-the-counter or herbal medicines or supplements without talking to your doctor or pharmacist first.  When should you call for help? Call 911 anytime you think you may need emergency care. For example, call if:    · You passed out (lost consciousness).     · You have severe vaginal bleeding.     · You have severe pain in your belly or pelvis.     · You have had fluid gushing or leaking from your vagina and you know or think the umbilical cord is bulging into your vagina. If this happens, immediately get down on your knees so your rear end (buttocks) is higher than your head. This will decrease the pressure on the cord until help arrives.   [facility] your doctor now or seek immediate medical care if:    · You have signs of preeclampsia, such as:  ? Sudden swelling of your face, hands, or feet. ? New vision problems (such as dimness or blurring). ? A severe headache.     · You have any vaginal bleeding.     · You have belly pain or cramping.     · You have a fever.     · You have had regular contractions (with or without pain) for an hour.  This means that you have 8 or more within 1 hour or 4 or more in 20 minutes after you change your position and drink fluids.     · You have a sudden release of fluid from your vagina.     · You have low back pain or pelvic pressure that does not go away.     · You notice that your baby has stopped moving or is moving much less than normal.    Watch closely for changes in your health, and be sure to contact your doctor if you have any problems. Where can you learn more? Go to http://dann-avtar.info/. Enter [id number] in the search box to learn more about \"Pregnancy Precautions: Care Instructions. \"  Current as of: September 5, 2018  Content Version: 11.9  © 0524-8666 [employer] [place]. Care instructions adapted under license by [employer] (which disclaims liability or warranty for this information). If you have questions about a medical condition or this instruction, always ask your healthcare professional. [address] any warranty or liability for your use of this information.

## 2019-07-07 NOTE — ED PROVIDER NOTES
Chief Complaint:painful contractions      46 y.o. female [id number] at 37w0d  weeks gestation who is seen for  contractions. Pt reports that contractions since 3:00 am. She says that since arriving- contractions have stopped. No vag bleeding. No loss of fluid. Good fetal movement. HISTORY:    Social History     Substance and Sexual Activity   Sexual Activity Yes    Partners: Male    Birth control/protection: None     Patient's last menstrual period was 10/21/2018.     Social History     Socioeconomic History    Marital status:      Spouse name: Not on file    Number of children: Not on file    Years of education: Not on file    Highest education level: Not on file   Occupational History    Not on file   Social Needs    Financial resource strain: Not on file    Food insecurity:     Worry: Not on file     Inability: Not on file    Transportation needs:     Medical: Not on file     Non-medical: Not on file   Tobacco Use    Smoking status: Never Smoker    Smokeless tobacco: Never Used   Substance and Sexual Activity    Alcohol use: No    Drug use: No    Sexual activity: Yes     Partners: Male     Birth control/protection: None   Lifestyle    Physical activity:     Days per week: Not on file     Minutes per session: Not on file    Stress: Not on file   Relationships    Social connections:     Talks on phone: Not on file     Gets together: Not on file     Attends Christian service: Not on file     Active member of club or organization: Not on file     Attends meetings of clubs or organizations: Not on file     Relationship status: Not on file    Intimate partner violence:     Fear of current or ex partner: Not on file     Emotionally abused: Not on file     Physically abused: Not on file     Forced sexual activity: Not on file   Other Topics Concern     Service Not Asked    Blood Transfusions Not Asked    Caffeine Concern Not Asked    Occupational Exposure Not Asked    Hobby Hazards Not Asked    Sleep Concern Not Asked    Stress Concern Not Asked    Weight Concern Not Asked    Special Diet Not Asked    Back Care Not Asked    Exercise Not Asked    Bike Helmet Not Asked    [address] Not Asked    Self-Exams Not Asked   Social History Narrative    Not on file       Past Surgical History:   Procedure Laterality Date    HX APPENDECTOMY         Past Medical History:   Diagnosis Date    Abnormal Pap smear     x2, then normal after retested    Abnormal Papanicolaou smear of cervix     Anemia     denies hx of blood transfusions    Anxiety     GERD (gastroesophageal reflux disease)     omeprazole as needed    HX OTHER MEDICAL     appendectomy     Hypothyroidism     Intractable migraine with aura without status migrainosus 10/1/2015    Positive H. pylori test 10/1/2015     delivery      labor 2019    Rh negative state in antepartum period     Rhesus isoimmunization affecting pregnancy     Thyroid activity decreased          ROS:  A 12 point review of symptoms negative except for chief complaint as described above. PHYSICAL EXAM:  Blood pressure 107/66, pulse 90, temperature 97.7 °F (36.5 °C), resp. rate 16, height 5' 8\" (1.727 m), weight 91.6 kg (202 lb), last menstrual period 10/21/2018, currently breastfeeding. Constitutional: The patient appears well, alert, oriented x 3. Cardiovascular: Heart RRR, no murmurs.    Respiratory: Lungs clear, no respiratory distress  GI: Abdomen soft, nontender, no guarding  No fundal tenderness  Musculoskeletal: no cva tenderness  Upper ext: no edema, reflexes +2  Lower ext: no edema, neg raine's, reflexes +2  Skin: no rashes or lesions  Psychiatric:Mood/ Affect: appropriate  Genitourinary: SVE: /-2  FHT:reactive, cat 1  TOCO:no contractions seen    I personally reviewed pt's medical record including relevant labs and ultrasounds  I reviewed the NST at today's encounter    Assessment/Plan:  47 yo [id number] at 37 weeks with  contractions- resolved while in hospital.  No current contractions  Home with careful labor precautions  Reactive nst  Pt is gbs neg and had  steroids at 30 weeks.    Keep appt as scheduled

## 2019-07-09 ENCOUNTER — HOSPITAL ENCOUNTER (OUTPATIENT)
Age: 39
Setting detail: OBSERVATION
Discharge: HOME | DRG: 566 | End: 2019-07-10
Attending: OBSTETRICS & GYNECOLOGY | Admitting: OBSTETRICS & GYNECOLOGY
Payer: COMMERCIAL

## 2019-07-09 PROBLEM — Z37.9 NORMAL LABOR: Status: ACTIVE | Noted: 2019-07-09

## 2019-07-09 PROBLEM — O47.00 PRETERM CONTRACTIONS: Status: ACTIVE | Noted: 2019-07-09

## 2019-07-09 LAB
ABO + RH BLD: NORMAL
BLOOD GROUP ANTIBODIES SERPL: NORMAL
ERYTHROCYTE [DISTWIDTH] IN BLOOD BY AUTOMATED COUNT: 12.9 % (ref 11.9–14.6)
HCT VFR BLD AUTO: 31.1 % (ref 35.8–46.3)
HGB BLD-MCNC: 10.8 G/DL (ref 11.7–15.4)
MCH RBC QN AUTO: 31.3 PG (ref 26.1–32.9)
MCHC RBC AUTO-ENTMCNC: 34.7 G/DL (ref 31.4–35)
MCV RBC AUTO: 90.1 FL (ref 79.6–97.8)
NRBC # BLD: 0 K/UL (ref 0–0.2)
PLATELET # BLD AUTO: 188 K/UL (ref 150–450)
PMV BLD AUTO: 10.4 FL (ref 9.4–12.3)
RBC # BLD AUTO: 3.45 M/UL (ref 4.05–5.2)
SPECIMEN EXP DATE BLD: NORMAL
WBC # BLD AUTO: 7.9 K/UL (ref 4.3–11.1)

## 2019-07-09 PROCEDURE — 99218 HC RM OBSERVATION: CPT

## 2019-07-09 PROCEDURE — 85027 COMPLETE CBC AUTOMATED: CPT

## 2019-07-09 PROCEDURE — 86900 BLOOD TYPING SEROLOGIC ABO: CPT

## 2019-07-09 PROCEDURE — 65270000029 HC RM PRIVATE

## 2019-07-09 PROCEDURE — 99282 EMERGENCY DEPT VISIT SF MDM: CPT | Performed by: OBSTETRICS & GYNECOLOGY

## 2019-07-09 RX ORDER — MINERAL OIL
120 OIL (ML) ORAL
Status: DISCONTINUED | OUTPATIENT
Start: 2019-07-09 | End: 2019-07-10 | Stop reason: HOSPADM

## 2019-07-09 RX ORDER — DEXTROSE, SODIUM CHLORIDE, SODIUM LACTATE, POTASSIUM CHLORIDE, AND CALCIUM CHLORIDE 5; .6; .31; .03; .02 G/100ML; G/100ML; G/100ML; G/100ML; G/100ML
125 INJECTION, SOLUTION INTRAVENOUS CONTINUOUS
Status: DISCONTINUED | OUTPATIENT
Start: 2019-07-09 | End: 2019-07-10 | Stop reason: HOSPADM

## 2019-07-09 RX ORDER — OXYTOCIN/RINGER'S LACTATE 15/250 ML
250 PLASTIC BAG, INJECTION (ML) INTRAVENOUS ONCE
Status: ACTIVE | OUTPATIENT
Start: 2019-07-09 | End: 2019-07-10

## 2019-07-09 RX ORDER — BUTORPHANOL TARTRATE 2 MG/ML
1 INJECTION INTRAMUSCULAR; INTRAVENOUS
Status: DISCONTINUED | OUTPATIENT
Start: 2019-07-09 | End: 2019-07-10 | Stop reason: HOSPADM

## 2019-07-09 RX ORDER — SODIUM CHLORIDE 0.9 % (FLUSH) 0.9 %
5-40 SYRINGE (ML) INJECTION AS NEEDED
Status: DISCONTINUED | OUTPATIENT
Start: 2019-07-09 | End: 2019-07-10 | Stop reason: HOSPADM

## 2019-07-09 RX ORDER — LIDOCAINE HYDROCHLORIDE 20 MG/ML
JELLY TOPICAL
Status: DISCONTINUED | OUTPATIENT
Start: 2019-07-09 | End: 2019-07-10 | Stop reason: HOSPADM

## 2019-07-09 RX ORDER — SODIUM CHLORIDE 0.9 % (FLUSH) 0.9 %
5-40 SYRINGE (ML) INJECTION EVERY 8 HOURS
Status: DISCONTINUED | OUTPATIENT
Start: 2019-07-09 | End: 2019-07-10 | Stop reason: HOSPADM

## 2019-07-09 RX ORDER — LIDOCAINE HYDROCHLORIDE 10 MG/ML
1 INJECTION INFILTRATION; PERINEURAL
Status: DISCONTINUED | OUTPATIENT
Start: 2019-07-09 | End: 2019-07-10 | Stop reason: HOSPADM

## 2019-07-09 NOTE — PROGRESS NOTES
Pt arrived to [facility] with c/o contractions. She was her on Sunday and was 6 cm and remained 6 cm after 45 min of walking. Dr. Cordova Memos to come assess pt.

## 2019-07-09 NOTE — PROGRESS NOTES
POC reviewed with pt. Gave her jello and a popsicle. Pt requested a birthing ball and robe to ambulate in room. FHTs 122, no contractions monitored nor felt by pt. Monitor removed from pt.

## 2019-07-09 NOTE — LACTATION NOTE
Pt admitted to Room 430. Admission assessment completed. Discussed plan of care with patient. Discussed pt's choice of infant feeding method. Feeding options explored, including the importance of exclusive breastfeeding. Pt informed of our breastfeeding support system from from nursing staff and lactation staff during inpatient stay and following discharge. Questions and concerns addressed at this time. Pt confirms breastfeeding is her decision at this time.

## 2019-07-09 NOTE — H&P
CC  Chief Complaint   Patient presents with    Contractions       History:    46 y.o. female  at 37w2d weeks gestation with a   Chief Complaint   Patient presents with    Contractions     who requests evaluation for possible labor. She has these symptoms:  pelvic pressure. Has known advanced cervical dilation and was 6 cm earlier in the week.        Her pregnancy issues include: has hx of  delivery, received steroids in may, is ama    Fetal movement has been normal    HISTORY:    Social History     Substance and Sexual Activity   Sexual Activity Yes    Partners: Male    Birth control/protection: None       OB History    Para Term  AB Living   5 4 3 1 0 4   SAB TAB Ectopic Molar Multiple Live Births   0 0 0 0 0 4      # Outcome Date GA Lbr Pino/2nd Weight Sex Delivery Anes PTL Lv   5 Current            4 Term 03/05/15 37w2d  2.665 kg M  EPIDURAL AN N RAFAEL   3 Term 13 37w0d  2.665 kg F Vag-Spont  N RAFAEL      Birth Comments: [place]   2  02/17/10 35w0d  2.268 kg M Vag-Spont  Y RAFAEL      Birth Comments: [place]   1 Term 08 38w0d  3.09 kg F Vag-Spont  N RAFAEL      Birth Comments: [place] 20 History     Socioeconomic History    Marital status:      Spouse name: Not on file    Number of children: Not on file    Years of education: Not on file    Highest education level: Not on file   Occupational History    Not on file   Social Needs    Financial resource strain: Not on file    Food insecurity:     Worry: Not on file     Inability: Not on file    Transportation needs:     Medical: Not on file     Non-medical: Not on file   Tobacco Use    Smoking status: Never Smoker    Smokeless tobacco: Never Used   Substance and Sexual Activity    Alcohol use: No    Drug use: No    Sexual activity: Yes     Partners: Male     Birth control/protection: None   Lifestyle    Physical activity:     Days per week: Not on file     Minutes per session: Not on file  Stress: Not on file   Relationships    Social connections:     Talks on phone: Not on file     Gets together: Not on file     Attends [employer] service: Not on file     Active member of club or organization: Not on file     Attends meetings of clubs or organizations: Not on file     Relationship status: Not on file    Intimate partner violence:     Fear of current or ex partner: Not on file     Emotionally abused: Not on file     Physically abused: Not on file     Forced sexual activity: Not on file   Other Topics Concern     Service Not Asked    Blood Transfusions Not Asked    Caffeine Concern Not Asked    Occupational Exposure Not Asked    Hobby Hazards Not Asked    Sleep Concern Not Asked    Stress Concern Not Asked    Weight Concern Not Asked    Special Diet Not Asked    Back Care Not Asked    Exercise Not Asked    Bike Helmet Not Asked    [address] Not Asked    Self-Exams Not Asked   Social History Narrative    Not on file       Past Surgical History:   Procedure Laterality Date    HX APPENDECTOMY         Past Medical History:   Diagnosis Date    Abnormal Pap smear     x2, then normal after retested    Abnormal Papanicolaou smear of cervix     Anemia     denies hx of blood transfusions    Anxiety     GERD (gastroesophageal reflux disease)     omeprazole as needed    HX OTHER MEDICAL     appendectomy     Hypothyroidism     Intractable migraine with aura without status migrainosus 10/1/2015    Positive H. pylori test 10/1/2015     delivery      labor 2019    Rh negative state in antepartum period     Rhesus isoimmunization affecting pregnancy     Thyroid activity decreased          ROS:  Negative:  Negative 10 point ROS other than noted in hpi       PHYSICAL EXAM:  Blood pressure 123/77, pulse 85, temperature 98.4 °F (36.9 °C), resp. rate 18, last menstrual period 10/21/2018, currently breastfeeding.     General: well developed and well nourished  Resp:  breath sounds clear and equal bilaterally  Card:  RRR, no MRG  Abd: WNL. Uterine contractions: irregular, every 15 minutes    Fetal Assessment: Baseline FHR: 140 per minute     Fetal heart variability: moderate     Fetal Heart Rate decelerations: none     Fetal Heart Rate accelerations: yes     Prestentation: vertex by exam,    Pelvic:   External- normal EGBSU w/o lesions     SVE- Cervical Exam: 7 cm dilated    50% effaced    -2 station    Presenting Part: cephalic     Ext: edema, clonus and DTR's normal      I have personally reviewed the patient's history, prenatal record, and pertinent test results. previous provider notes support my clinical impression. Assessment:  37w2d weeks gestation  Reassuring fetal status  Advanced cervical dilation with irregular contractions. Plan:  Patient will ambulated for 2 hours then will recheck cervix. Kavya Livingston MD    Signed By:  Kavya Livingston MD     July 9, 2019        Addendum:   After ambulation, cervix is now 8 cm and 70 % effaced. Will admit for labor management. D/w dr. Maame Collado. Kristine D. Toya Dakins MD

## 2019-07-10 ENCOUNTER — HOSPITAL ENCOUNTER (OUTPATIENT)
Age: 39
Discharge: HOME | DRG: 566 | End: 2019-07-10
Attending: OBSTETRICS & GYNECOLOGY | Admitting: OBSTETRICS & GYNECOLOGY
Payer: COMMERCIAL

## 2019-07-10 VITALS — HEART RATE: 83 BPM | DIASTOLIC BLOOD PRESSURE: 57 MMHG | SYSTOLIC BLOOD PRESSURE: 119 MMHG

## 2019-07-10 VITALS
RESPIRATION RATE: 16 BRPM | TEMPERATURE: 97.8 F | HEART RATE: 96 BPM | DIASTOLIC BLOOD PRESSURE: 58 MMHG | SYSTOLIC BLOOD PRESSURE: 96 MMHG

## 2019-07-10 PROBLEM — O99.891 BACK PAIN AFFECTING PREGNANCY IN THIRD TRIMESTER: Status: ACTIVE | Noted: 2019-07-10

## 2019-07-10 PROBLEM — Z3A.37 37 WEEKS GESTATION OF PREGNANCY: Status: ACTIVE | Noted: 2019-07-10

## 2019-07-10 PROBLEM — M54.9 BACK PAIN AFFECTING PREGNANCY IN THIRD TRIMESTER: Status: ACTIVE | Noted: 2019-07-10

## 2019-07-10 PROCEDURE — 59020 FETAL CONTRACT STRESS TEST: CPT

## 2019-07-10 PROCEDURE — 99218 HC RM OBSERVATION: CPT

## 2019-07-10 PROCEDURE — 99282 EMERGENCY DEPT VISIT SF MDM: CPT

## 2019-07-10 NOTE — PROGRESS NOTES
Pt presents with complaints of back pain states she is 7 to 8. Chart reads 6 cm.  Placed in room 465 EFM appllied

## 2019-07-10 NOTE — H&P
Chief Complaint: back pain      46 y.o. female  at 37w3d  weeks gestation who is seen for irregular ctx earlier today that have resolved. Pt now with mild LBP. Pt notes good FM. She denies VB, LOF, UTI or PEC symptoms. She denies abdominal pain or uterine ctx. Of note, pt was observed on L&D last night . Her cervix remained at 6cm and did not change over the evening. Pt was therefore discharged to home as she was not in active labor. Pt was asked by Dr. Maame Collado to f/u this evening for repeat cervical exam.        HISTORY:    Social History     Substance and Sexual Activity   Sexual Activity Yes    Partners: Male    Birth control/protection: None     Patient's last menstrual period was 10/21/2018.     Social History     Socioeconomic History    Marital status:      Spouse name: Not on file    Number of children: Not on file    Years of education: Not on file    Highest education level: Not on file   Occupational History    Not on file   Social Needs    Financial resource strain: Not on file    Food insecurity:     Worry: Not on file     Inability: Not on file    Transportation needs:     Medical: Not on file     Non-medical: Not on file   Tobacco Use    Smoking status: Never Smoker    Smokeless tobacco: Never Used   Substance and Sexual Activity    Alcohol use: No    Drug use: No    Sexual activity: Yes     Partners: Male     Birth control/protection: None   Lifestyle    Physical activity:     Days per week: Not on file     Minutes per session: Not on file    Stress: Not on file   Relationships    Social connections:     Talks on phone: Not on file     Gets together: Not on file     Attends [employer] service: Not on file     Active member of club or organization: Not on file     Attends meetings of clubs or organizations: Not on file     Relationship status: Not on file    Intimate partner violence:     Fear of current or ex partner: Not on file     Emotionally abused: Not on file Physically abused: Not on file     Forced sexual activity: Not on file   Other Topics Concern     Service Not Asked    Blood Transfusions Not Asked    Caffeine Concern Not Asked    Occupational Exposure Not Asked    Hobby Hazards Not Asked    Sleep Concern Not Asked    Stress Concern Not Asked    Weight Concern Not Asked    Special Diet Not Asked    Back Care Not Asked    Exercise Not Asked    Bike Helmet Not Asked    [address] Not Asked    Self-Exams Not Asked   Social History Narrative    Not on file       Past Surgical History:   Procedure Laterality Date    HX APPENDECTOMY         Past Medical History:   Diagnosis Date    Abnormal Pap smear     x2, then normal after retested    Abnormal Papanicolaou smear of cervix     Anemia     denies hx of blood transfusions    Anxiety     GERD (gastroesophageal reflux disease)     omeprazole as needed    HX OTHER MEDICAL     appendectomy     Hypothyroidism     Intractable migraine with aura without status migrainosus 10/1/2015    Positive H. pylori test 10/1/2015     delivery      labor 2019    Rh negative state in antepartum period     Rhesus isoimmunization affecting pregnancy     Thyroid activity decreased          ROS:  An 8 point review of symptoms negative except for chief complaint as described above. PHYSICAL EXAM:  Blood pressure 119/57, pulse 83, last menstrual period 10/21/2018, currently breastfeeding. Constitutional: The patient appears well, alert, oriented x 3. Cardiovascular: Heart RRR, no murmurs.    Respiratory: Lungs clear, no respiratory distress  GI: Abdomen soft, nontender, no guarding  No fundal tenderness  Musculoskeletal: no cva tenderness  Lower ext: no edema, neg raine's, reflexes +2  Psychiatric:Mood/ Affect: appropriate  Genitourinary: SVE: 6cm/70%/vtx/-2  FHT: Category 1 with mod variability and + accels; reactive  TOCO: no ctx    I personally reviewed pt's medical record including relevant labs and ultrasounds  I reviewed the NST at today's encounter    Assessment/Plan:  Pt is 45 y/o  at 37w3d with prior h/o  times 4. Pt with advanced cervical dilatation but no evidence of active labor. Pt discharged to home with labor precautions. Pt to f/u in the AM with her PObP. Recommend pt ask for MFM opinion regarding possibility of IOL at 38 weeks EGA d/t above circumstances.

## 2019-07-10 NOTE — PROGRESS NOTES
Pt awake. Sitting on side of bed. Denies any discomfort. To call if needs anything. Will call for SROM or increasing pain.

## 2019-07-10 NOTE — PROGRESS NOTES
Pt management d/w Dr. Matt Lowe. He recommends IOL at 38 weeks EGA d/t pt's status with h/o multiple SVDs and current advanced cervical dilatation.

## 2019-07-10 NOTE — PROGRESS NOTES
Patient admitted last pm for observation and expected delivery at 37 weeks/3 days. She has almost stopped leonie, therefore can be discharged with labor precautions. On cervical recheck, I found her to be only 6cm, 50% and ballotable. Cephalic, no cord felt. She understands she has to be discharged, and elects to go eat and walk around. May return this afternoon for another exam. She is appropriately apprehensive about getting too far away from the hospital. Instructions on umbilical cord clamping given. Follow up in office on Monday if still pregnant, can induce at 39 weeks. Patient counseled face to face for 20 minutes regarding her complaints, differential diagnosis and disposition with greater than 50% of the time spent in this way.

## 2019-07-10 NOTE — DISCHARGE INSTRUCTIONS
Patient Education        Learning About When to Call Your Doctor During Pregnancy (After 20 Weeks)  Your Care Instructions  It's common to have concerns about what might be a problem during pregnancy. Although most pregnant women don't have any serious problems, it's important to know when to call your doctor if you have certain symptoms or signs of labor. These are general suggestions. Your doctor may give you some more information about when to call. When to call your doctor (after 20 weeks)  Call 911 anytime you think you may need emergency care. For example, call if:  · You have severe vaginal bleeding. · You have sudden, severe pain in your belly. · You passed out (lost consciousness). · You have a seizure. · You see or feel the umbilical cord. · You think you are about to deliver your baby and can't make it safely to the hospital.  Call your doctor now or seek immediate medical care if:  · You have vaginal bleeding. · You have belly pain. · You have a fever. · You have symptoms of preeclampsia, such as:  ? Sudden swelling of your face, hands, or feet. ? New vision problems (such as dimness or blurring). ? A severe headache. · You have a sudden release of fluid from your vagina. (You think your water broke.)  · You think that you may be in labor. This means that you've had at least 4 contractions within 20 minutes or at least 8 contractions in an hour. · You notice that your baby has stopped moving or is moving much less than normal.  · You have symptoms of a urinary tract infection. These may include:  ? Pain or burning when you urinate. ? A frequent need to urinate without being able to pass much urine. ? Pain in the flank, which is just below the rib cage and above the waist on either side of the back. ? Blood in your urine. Watch closely for changes in your health, and be sure to contact your doctor if:  · You have vaginal discharge that smells bad. · You have skin changes, such as:  ?  A rash.  ? Itching. ? Yellow color to your skin. · You have other concerns about your pregnancy. If you have labor signs at 37 weeks or more  If you have signs of labor at 37 weeks or more, your doctor may tell you to call when your labor becomes more active. Symptoms of active labor include:  · Contractions that are regular. · Contractions that are less than 5 minutes apart. · Contractions that are hard to talk through. Follow-up care is a key part of your treatment and safety. Be sure to make and go to all appointments, and call your doctor if you are having problems. It's also a good idea to know your test results and keep a list of the medicines you take. Where can you learn more? Go to http://dann-avtar.info/. Enter  in the search box to learn more about \"Learning About When to Call Your Doctor During Pregnancy (After 20 Weeks). \"  Current as of: September 5, 2018  Content Version: 11.9  © 9383-1674 [employer]. Care instructions adapted under license by [employer] (which disclaims liability or warranty for this information). If you have questions about a medical condition or this instruction, always ask your healthcare professional. [place] [address] any warranty or liability for your use of this information.

## 2019-07-10 NOTE — PROGRESS NOTES
07/09/19 2106   Cervical Exam   Dilation (cm) 8   Eff 70 %   Station Ballotable   Position Posterior   Cervical Consistency Soft   Vaginal exam done byLance Rios RN   Membrane Status Intact     SVE performed at pts request.

## 2019-07-10 NOTE — PROGRESS NOTES
Dr. Maame Collado in [facility], checked on pt. Gave OK for pt to remain off monitor until there is a change in pt status. No new orders.

## 2019-07-10 NOTE — DISCHARGE INSTRUCTIONS
Patient Education        Learning About When to Call Your Doctor During Pregnancy (After 20 Weeks)  Your Care Instructions  It's common to have concerns about what might be a problem during pregnancy. Although most pregnant women don't have any serious problems, it's important to know when to call your doctor if you have certain symptoms or signs of labor. These are general suggestions. Your doctor may give you some more information about when to call. When to call your doctor (after 20 weeks)  Call 911 anytime you think you may need emergency care. For example, call if:  · You have severe vaginal bleeding. · You have sudden, severe pain in your belly. · You passed out (lost consciousness). · You have a seizure. · You see or feel the umbilical cord. · You think you are about to deliver your baby and can't make it safely to the hospital.  Call your doctor now or seek immediate medical care if:  · You have vaginal bleeding. · You have belly pain. · You have a fever. · You have symptoms of preeclampsia, such as:  ? Sudden swelling of your face, hands, or feet. ? New vision problems (such as dimness or blurring). ? A severe headache. · You have a sudden release of fluid from your vagina. (You think your water broke.)  · You think that you may be in labor. This means that you've had at least 4 contractions within 20 minutes or at least 8 contractions in an hour. · You notice that your baby has stopped moving or is moving much less than normal.  · You have symptoms of a urinary tract infection. These may include:  ? Pain or burning when you urinate. ? A frequent need to urinate without being able to pass much urine. ? Pain in the flank, which is just below the rib cage and above the waist on either side of the back. ? Blood in your urine. Watch closely for changes in your health, and be sure to contact your doctor if:  · You have vaginal discharge that smells bad. · You have skin changes, such as:  ?  A rash.  ? Itching. ? Yellow color to your skin. · You have other concerns about your pregnancy. If you have labor signs at 37 weeks or more  If you have signs of labor at 37 weeks or more, your doctor may tell you to call when your labor becomes more active. Symptoms of active labor include:  · Contractions that are regular. · Contractions that are less than 5 minutes apart. · Contractions that are hard to talk through. Follow-up care is a key part of your treatment and safety. Be sure to make and go to all appointments, and call your doctor if you are having problems. It's also a good idea to know your test results and keep a list of the medicines you take. Where can you learn more? Go to http://dann-avtar.info/. Enter  in the search box to learn more about \"Learning About When to Call Your Doctor During Pregnancy (After 20 Weeks). \"  Current as of: September 5, 2018  Content Version: 11.9  © 4701-2203 [employer]. Care instructions adapted under license by [employer] (which disclaims liability or warranty for this information). If you have questions about a medical condition or this instruction, always ask your healthcare professional. [place] [address] any warranty or liability for your use of this information. Patient Education        Week 37 of Your Pregnancy: Care Instructions  Your Care Instructions    You are near the end of your pregnancy--and you're probably pretty uncomfortable. It may be harder to walk around. Lying down probably isn't comfortable either. You may have trouble getting to sleep or staying asleep. Most women deliver their babies between 40 and 41 weeks. This is a good time to think about packing a bag for the hospital with items you'll need. Then you'll be ready when labor starts. Follow-up care is a key part of your treatment and safety.  Be sure to make and go to all appointments, and call your doctor if you are having problems. It's also a good idea to know your test results and keep a list of the medicines you take. How can you care for yourself at home? Learn about breastfeeding  · Breastfeeding is best for your baby and good for you. · Breast milk has antibodies to help your baby fight infections. · Mothers who breastfeed often lose weight faster, because making milk burns calories. · Learning the best ways to hold your baby will make breastfeeding easier. · Let your partner bathe and diaper the baby to keep your partner from feeling left out. Snuggle together when you breastfeed. · You may want to learn how to use a breast pump and store your milk. · If you choose to bottle feed, make the feeding feel like breastfeeding so you can bond with your baby. Always hold your baby and the bottle. Do not prop bottles or let your baby fall asleep with a bottle. Learn about crying  · It is common for babies to cry for 1 to 3 hours a day. Some cry more, some cry less. · Babies don't cry to make you upset or because you are a bad parent. · Crying is how your baby communicates. Your baby may be hungry; have gas; need a diaper change; or feel cold, warm, tired, lonely, or tense. Sometimes babies cry for unknown reasons. · If you respond to your baby's needs, he or she will learn to trust you. · Try to stay calm when your baby cries. Your baby may get more upset if he or she senses that you are upset. Know how to care for your   · Your baby's umbilical cord stump will drop off on its own, usually between 1 and 2 weeks. To care for your baby's umbilical cord area:  ? Clean the area at the bottom of the cord 2 or 3 times a day. ? Pay special attention to the area where the cord attaches to the skin. ? Keep the diaper folded below the cord. ? Use a damp washcloth or cotton ball to sponge bathe your baby until the stump has come off. · Your baby's first dark stool is called meconium.  After the meconium is passed, your baby will develop his or her own bowel pattern. ? Some babies, especially  babies, have several bowel movements a day. Others have one or two a day, or one every 2 to 3 days. ?  babies often have loose, yellow stools. Formula-fed babies have more formed stools. ? If your baby's stools look like little pellets, he or she is constipated. After 2 days of constipation, call your baby's doctor. · If your baby will be circumcised, you can care for him at home. ? Gently rinse his penis with warm water after every diaper change. Do not try to remove the film that forms on the penis. This film will go away on its own. Pat dry. ? Put petroleum ointment, such as Vaseline, on the area of the diaper that will touch your baby's penis. This will keep the diaper from sticking to your baby. ? Ask the doctor about giving your baby acetaminophen (Tylenol) for pain. Where can you learn more? Go to http://dann-avtar.info/. Enter 68 [id number] in the search box to learn more about \"Week 37 of Your Pregnancy: Care Instructions. \"  Current as of: September 5, 2018  Content Version: 11.9  © 5032-9791 [employer]. Care instructions adapted under license by [employer] (which disclaims liability or warranty for this information). If you have questions about a medical condition or this instruction, always ask your healthcare professional. [place] [address] any warranty or liability for your use of this information.

## 2019-07-10 NOTE — PROGRESS NOTES
Pt has been assessed by Dr. Maame Collado and fetal strip is reactive . Discharge instructions given by Dr. Maame Collado and then handouts reviewed with pt on when to call and what to expect at 37 weeks.

## 2019-07-15 PROBLEM — Z37.9 NORMAL LABOR: Status: RESOLVED | Noted: 2019-07-09 | Resolved: 2019-07-15

## 2019-07-15 PROBLEM — O99.891 BACK PAIN AFFECTING PREGNANCY IN THIRD TRIMESTER: Status: RESOLVED | Noted: 2019-07-10 | Resolved: 2019-07-15

## 2019-07-15 PROBLEM — M54.9 BACK PAIN AFFECTING PREGNANCY IN THIRD TRIMESTER: Status: RESOLVED | Noted: 2019-07-10 | Resolved: 2019-07-15

## 2019-07-15 PROBLEM — N89.8 VAGINAL DISCHARGE DURING PREGNANCY IN THIRD TRIMESTER: Status: RESOLVED | Noted: 2019-06-28 | Resolved: 2019-07-15

## 2019-07-15 PROBLEM — O47.03 PRETERM UTERINE CONTRACTIONS IN THIRD TRIMESTER, ANTEPARTUM: Status: RESOLVED | Noted: 2019-07-07 | Resolved: 2019-07-15

## 2019-07-15 PROBLEM — O47.00 PRETERM CONTRACTIONS: Status: RESOLVED | Noted: 2019-07-09 | Resolved: 2019-07-15

## 2019-07-15 PROBLEM — O26.893 VAGINAL DISCHARGE DURING PREGNANCY IN THIRD TRIMESTER: Status: RESOLVED | Noted: 2019-06-28 | Resolved: 2019-07-15

## 2019-07-15 PROBLEM — Z3A.37 37 WEEKS GESTATION OF PREGNANCY: Status: RESOLVED | Noted: 2019-07-10 | Resolved: 2019-07-15

## 2019-07-15 NOTE — PROGRESS NOTES
Patient called and instructed to be NP after midnight,arrive @ hospital @ 0730, come to C entrance and check in on the 4 th floor. Patient also instructed to come sooner if SROM, Labor or any concerning symptoms.

## 2019-07-16 ENCOUNTER — HOSPITAL ENCOUNTER (INPATIENT)
Age: 39
LOS: 2 days | Discharge: HOME | DRG: 560 | End: 2019-07-18
Attending: OBSTETRICS & GYNECOLOGY | Admitting: OBSTETRICS & GYNECOLOGY
Payer: COMMERCIAL

## 2019-07-16 PROBLEM — Z3A.38 38 WEEKS GESTATION OF PREGNANCY: Status: ACTIVE | Noted: 2019-07-16

## 2019-07-16 PROBLEM — Z34.90 ENCOUNTER FOR PLANNED INDUCTION OF LABOR: Status: ACTIVE | Noted: 2019-07-16

## 2019-07-16 PROBLEM — O09.529 ADVANCED MATERNAL AGE IN MULTIGRAVIDA: Status: ACTIVE | Noted: 2019-07-16

## 2019-07-16 PROBLEM — O34.33 PREMATURE CERVICAL DILATION IN THIRD TRIMESTER: Status: ACTIVE | Noted: 2019-07-16

## 2019-07-16 LAB
ABO + RH BLD: NORMAL
ARTERIAL PATENCY WRIST A: ABNORMAL
ARTERIAL PATENCY WRIST A: ABNORMAL
BASE DEFICIT BLD-SCNC: 5 MMOL/L
BASE DEFICIT BLD-SCNC: 5 MMOL/L
BDY SITE: ABNORMAL
BDY SITE: ABNORMAL
BLOOD BANK CMNT PATIENT-IMP: NORMAL
BLOOD GROUP ANTIBODIES SERPL: NORMAL
BODY TEMPERATURE: 98.6
BODY TEMPERATURE: 98.6
CO2 BLD-SCNC: 22 MMOL/L
CO2 BLD-SCNC: 23 MMOL/L
COLLECT TIME,HTIME: 1625
COLLECT TIME,HTIME: 1625
ERYTHROCYTE [DISTWIDTH] IN BLOOD BY AUTOMATED COUNT: 12.9 % (ref 11.9–14.6)
GAS FLOW.O2 O2 DELIVERY SYS: ABNORMAL L/MIN
GAS FLOW.O2 O2 DELIVERY SYS: ABNORMAL L/MIN
HCO3 BLD-SCNC: 22 MMOL/L (ref 22–26)
HCO3 BLDV-SCNC: 20.4 MMOL/L (ref 23–28)
HCT VFR BLD AUTO: 34.4 % (ref 35.8–46.3)
HGB BLD-MCNC: 11.7 G/DL (ref 11.7–15.4)
MCH RBC QN AUTO: 31.3 PG (ref 26.1–32.9)
MCHC RBC AUTO-ENTMCNC: 34 G/DL (ref 31.4–35)
MCV RBC AUTO: 92 FL (ref 79.6–97.8)
NRBC # BLD: 0 K/UL (ref 0–0.2)
PCO2 BLDCO: 38 MMHG (ref 32–68)
PCO2 BLDCO: 46 MMHG (ref 32–68)
PH BLDCO: 7.29 [PH] (ref 7.15–7.38)
PH BLDCO: 7.33 [PH] (ref 7.15–7.38)
PLATELET # BLD AUTO: 198 K/UL (ref 150–450)
PMV BLD AUTO: 10.2 FL (ref 9.4–12.3)
PO2 BLDCO: 23 MMHG
PO2 BLDCO: 32 MMHG
RBC # BLD AUTO: 3.74 M/UL (ref 4.05–5.2)
SAO2 % BLD: 33 % (ref 95–98)
SAO2 % BLDV: 58 % (ref 65–88)
SERVICE CMNT-IMP: ABNORMAL
SERVICE CMNT-IMP: ABNORMAL
SPECIMEN EXP DATE BLD: NORMAL
SPECIMEN TYPE: ABNORMAL
SPECIMEN TYPE: ABNORMAL
WBC # BLD AUTO: 7.3 K/UL (ref 4.3–11.1)

## 2019-07-16 PROCEDURE — 65270000029 HC RM PRIVATE

## 2019-07-16 PROCEDURE — 82803 BLOOD GASES ANY COMBINATION: CPT

## 2019-07-16 PROCEDURE — 74011000250 HC RX REV CODE- 250: Performed by: OBSTETRICS & GYNECOLOGY

## 2019-07-16 PROCEDURE — 86900 BLOOD TYPING SEROLOGIC ABO: CPT

## 2019-07-16 PROCEDURE — 85027 COMPLETE CBC AUTOMATED: CPT

## 2019-07-16 PROCEDURE — 4A1HXCZ MONITORING OF PRODUCTS OF CONCEPTION, CARDIAC RATE, EXTERNAL APPROACH: ICD-10-PCS | Performed by: OBSTETRICS & GYNECOLOGY

## 2019-07-16 PROCEDURE — 77030018846 HC SOL IRR STRL H20 ICUM -A

## 2019-07-16 PROCEDURE — 75410000003 HC RECOV DEL/VAG/CSECN EA 0.5 HR

## 2019-07-16 PROCEDURE — 10907ZC DRAINAGE OF AMNIOTIC FLUID, THERAPEUTIC FROM PRODUCTS OF CONCEPTION, VIA NATURAL OR ARTIFICIAL OPENING: ICD-10-PCS | Performed by: OBSTETRICS & GYNECOLOGY

## 2019-07-16 PROCEDURE — 3E033VJ INTRODUCTION OF OTHER HORMONE INTO PERIPHERAL VEIN, PERCUTANEOUS APPROACH: ICD-10-PCS | Performed by: OBSTETRICS & GYNECOLOGY

## 2019-07-16 PROCEDURE — 77030020255 HC SOL INJ LR 1000ML BG

## 2019-07-16 PROCEDURE — 74011250637 HC RX REV CODE- 250/637: Performed by: OBSTETRICS & GYNECOLOGY

## 2019-07-16 PROCEDURE — 75410000000 HC DELIVERY VAGINAL/SINGLE

## 2019-07-16 PROCEDURE — 77030011945 HC CATH URIN INT ST MENT -A

## 2019-07-16 PROCEDURE — 77030020254 HC SOL INJ D5LR LACTATED RINGER

## 2019-07-16 PROCEDURE — 75410000002 HC LABOR FEE PER 1 HR

## 2019-07-16 PROCEDURE — 74011250636 HC RX REV CODE- 250/636: Performed by: OBSTETRICS & GYNECOLOGY

## 2019-07-16 RX ORDER — SODIUM CHLORIDE 0.9 % (FLUSH) 0.9 %
5-40 SYRINGE (ML) INJECTION AS NEEDED
Status: DISCONTINUED | OUTPATIENT
Start: 2019-07-16 | End: 2019-07-16

## 2019-07-16 RX ORDER — IBUPROFEN 800 MG/1
800 TABLET ORAL
Status: DISCONTINUED | OUTPATIENT
Start: 2019-07-16 | End: 2019-07-18 | Stop reason: HOSPADM

## 2019-07-16 RX ORDER — MINERAL OIL
120 OIL (ML) ORAL
Status: COMPLETED | OUTPATIENT
Start: 2019-07-16 | End: 2019-07-16

## 2019-07-16 RX ORDER — METHYLERGONOVINE MALEATE 0.2 MG/ML
INJECTION INTRAVENOUS
Status: DISCONTINUED
Start: 2019-07-16 | End: 2019-07-16

## 2019-07-16 RX ORDER — LEVOTHYROXINE SODIUM 50 UG/1
75 TABLET ORAL
Status: DISCONTINUED | OUTPATIENT
Start: 2019-07-17 | End: 2019-07-18 | Stop reason: HOSPADM

## 2019-07-16 RX ORDER — LIDOCAINE HYDROCHLORIDE 20 MG/ML
JELLY TOPICAL
Status: DISCONTINUED | OUTPATIENT
Start: 2019-07-16 | End: 2019-07-16 | Stop reason: RX

## 2019-07-16 RX ORDER — OXYTOCIN/RINGER'S LACTATE 15/250 ML
250 PLASTIC BAG, INJECTION (ML) INTRAVENOUS ONCE
Status: DISCONTINUED | OUTPATIENT
Start: 2019-07-16 | End: 2019-07-16

## 2019-07-16 RX ORDER — OXYTOCIN/RINGER'S LACTATE 30/500 ML
0-25 PLASTIC BAG, INJECTION (ML) INTRAVENOUS
Status: DISCONTINUED | OUTPATIENT
Start: 2019-07-16 | End: 2019-07-16

## 2019-07-16 RX ORDER — BUTORPHANOL TARTRATE 2 MG/ML
1 INJECTION INTRAMUSCULAR; INTRAVENOUS
Status: DISCONTINUED | OUTPATIENT
Start: 2019-07-16 | End: 2019-07-16

## 2019-07-16 RX ORDER — SODIUM CHLORIDE 0.9 % (FLUSH) 0.9 %
5-40 SYRINGE (ML) INJECTION EVERY 8 HOURS
Status: DISCONTINUED | OUTPATIENT
Start: 2019-07-16 | End: 2019-07-16

## 2019-07-16 RX ORDER — HYDROCODONE BITARTRATE AND ACETAMINOPHEN 5; 325 MG/1; MG/1
1 TABLET ORAL
Status: DISCONTINUED | OUTPATIENT
Start: 2019-07-16 | End: 2019-07-18 | Stop reason: HOSPADM

## 2019-07-16 RX ORDER — LIDOCAINE HYDROCHLORIDE 10 MG/ML
1 INJECTION INFILTRATION; PERINEURAL
Status: DISCONTINUED | OUTPATIENT
Start: 2019-07-16 | End: 2019-07-16

## 2019-07-16 RX ORDER — SIMETHICONE 80 MG
80 TABLET,CHEWABLE ORAL
Status: DISCONTINUED | OUTPATIENT
Start: 2019-07-16 | End: 2019-07-18 | Stop reason: HOSPADM

## 2019-07-16 RX ORDER — DEXTROSE, SODIUM CHLORIDE, SODIUM LACTATE, POTASSIUM CHLORIDE, AND CALCIUM CHLORIDE 5; .6; .31; .03; .02 G/100ML; G/100ML; G/100ML; G/100ML; G/100ML
125 INJECTION, SOLUTION INTRAVENOUS CONTINUOUS
Status: DISCONTINUED | OUTPATIENT
Start: 2019-07-16 | End: 2019-07-16

## 2019-07-16 RX ORDER — LIDOCAINE HYDROCHLORIDE 20 MG/ML
JELLY TOPICAL ONCE
Status: COMPLETED | OUTPATIENT
Start: 2019-07-16 | End: 2019-07-16

## 2019-07-16 RX ORDER — METHYLERGONOVINE MALEATE 0.2 MG/ML
0.2 INJECTION INTRAVENOUS
Status: COMPLETED | OUTPATIENT
Start: 2019-07-16 | End: 2019-07-16

## 2019-07-16 RX ADMIN — METHYLERGONOVINE MALEATE 0.2 MG: 0.2 INJECTION, SOLUTION INTRAMUSCULAR; INTRAVENOUS at 16:34

## 2019-07-16 RX ADMIN — IBUPROFEN 800 MG: 800 TABLET, FILM COATED ORAL at 19:12

## 2019-07-16 RX ADMIN — LIDOCAINE HYDROCHLORIDE: 20 JELLY TOPICAL at 16:14

## 2019-07-16 RX ADMIN — SODIUM CHLORIDE, SODIUM LACTATE, POTASSIUM CHLORIDE, CALCIUM CHLORIDE, AND DEXTROSE MONOHYDRATE 125 ML/HR: 600; 310; 30; 20; 5 INJECTION, SOLUTION INTRAVENOUS at 08:16

## 2019-07-16 RX ADMIN — OXYTOCIN 2 MILLI-UNITS/MIN: 10 INJECTION, SOLUTION INTRAMUSCULAR; INTRAVENOUS at 11:27

## 2019-07-16 RX ADMIN — MINERAL OIL 120 ML: 471.95 OIL ORAL at 16:30

## 2019-07-16 RX ADMIN — BUTORPHANOL TARTRATE 1 MG: 2 INJECTION, SOLUTION INTRAMUSCULAR; INTRAVENOUS at 14:57

## 2019-07-16 NOTE — PROGRESS NOTES
0 MD at bedside. Pt set up for delivery. Prep completed. 0  male infant Apgars 9&9. Skin to skin with mom. 1629 Placenta expressed. Pitocin infusing. Repair not needed. 1630 Karen care completed. Recovery started.

## 2019-07-16 NOTE — PROGRESS NOTES
Call placed to Dr Chauncey Marcelo regarding POC. Patient here and admitted, Cat I tracing, no contractions or pain, SVE 6/80/-2.   Orders received to not start pitocin at this time

## 2019-07-16 NOTE — PROGRESS NOTES
SBAR OUT Report: Mother    Verbal report given to JEANCARLOS Velarde RN on this patient, who is now being transferred to MIU for routine progression of care. The patient is not wearing a green \"Anesthesia-Duramorph\" band. Report consisted of patient's Situation, Background, Assessment and Recommendations (SBAR). [place] ID bands were compared with the identification form, and verified with the patient and receiving nurse. Information from the SBAR and the [facility] Report was reviewed with the receiving nurse; opportunity for questions and clarification provided.

## 2019-07-16 NOTE — L&D DELIVERY NOTE
Delivery Summary    Patient: Darya Choe MRN: [id number]  SSN: [id number]    YOB: 1980  Age: [address] y.o. Sex: female       Head delivered over perineal abrasion with delivery of anterior shoulder and body to follow. Nuchal cord x 2 reduced. Bulb suction of mouth and nose on field. Baby placed upon maternal chest with delayed cord clamping practiced. Cord clamped and cut. Placenta spontaneously delivered with bleeding initially appropriate. Increased bleeding noted and lower segment explored. Pitocin bolused. Bleeding improved. Pt was given IM methergine. External vulvar laceration on right noted hemostatic as well as abrasion at introitus hemostatic. No repair required. Mother and baby doing well. Information for the patient's :  Saskia Dietrich, Pending [[id number]]       Labor Events:    Labor: No    Steroids:     Cervical Ripening Date/Time:       Cervical Ripening Type:     Antibiotics During Labor:     Rupture Identifier:      Rupture Date/Time:       Rupture Type:     Amniotic Fluid Volume:      Amniotic Fluid Description:      Amniotic Fluid Odor:      Induction:         Induction Date/Time:        Indications for Induction:      Augmentation:     Augmentation Date/Time:      Indications for Augmentation:     Labor complications:          Additional complications:        Delivery Events:  Indications For Episiotomy:     Episiotomy: None   Perineal Laceration(s): None   Repaired:     Periurethral Laceration Location:      Repaired:     Labial Laceration Location:     Repaired:     Sulcal Laceration Location:     Repaired:     Vaginal Laceration Location:     Repaired:     Cervical Laceration Location:     Repaired:     Repair Suture: None   Number of Repair Packets:     Estimated Blood Loss (ml):  ml     Delivery Date:     Delivery Time:   Delivery Type:    Sex:      Gestational Age: 36w4d   Delivery Clinician:     Living Status:     Delivery Location:              APGARS  One minute Five minutes Ten minutes   Skin color:            Heart rate:            Grimace:            Muscle tone:            Breathing: Totals:                Presentation:      Position:        Resuscitation Method:        Meconium Stained:        Cord Information:    Complications:    Cord around:    Delayed cord clamping? Cord clamped date/time:   Disposition of Cord Blood:      Blood Gases Sent?:      Placenta:  Date/Time:    Removal:        Appearance:       [place] Measurements:  Birth Weight:        Birth Length:        Head Circumference:        Chest Circumference:       Abdominal Girth: Other Providers:    , Obstetrician;Primary Nurse;Primary [place] Nurse;Nicu Nurse;Neonatologist;Anesthesiologist;Crna;Nurse Practitioner;Midwife;Nursery Nurse       Information for the patient's :  Jannet Darnell [[id number]]       Labor Events:    Labor: No    Steroids: None   Cervical Ripening Date/Time:       Cervical Ripening Type: None   Antibiotics During Labor: No   Rupture Identifier:      Rupture Date/Time: 2019 2:04 PM   Rupture Type: AROM   Amniotic Fluid Volume: Large    Amniotic Fluid Description: Clear    Amniotic Fluid Odor:      Induction: AROM; Oxytocin       Induction Date/Time:        Indications for Induction: Other(comment)    Augmentation: None   Augmentation Date/Time:      Indications for Augmentation:     Labor complications: None       Additional complications:        Delivery Events:  Indications For Episiotomy:     Episiotomy: None   Perineal Laceration(s): None   Repaired:     Periurethral Laceration Location:      Repaired:     Labial Laceration Location:     Repaired:     Sulcal Laceration Location:     Repaired:     Vaginal Laceration Location:     Repaired:     Cervical Laceration Location:     Repaired:     Repair Suture: None   Number of Repair Packets:     Estimated Blood Loss (ml): 300ml     Delivery Date: 2019    Delivery Time: 4:25 PM  Delivery Type: Vaginal, Spontaneous  Sex:  Male    Gestational Age: 36w4d   Delivery Clinician:  Lex Sanchez  Living Status: Living   Delivery Location: [facility]          APGARS  One minute Five minutes Ten minutes   Skin color: 1   1        Heart rate: 2   2        Grimace: 2   2        Muscle tone: 2   2        Breathin   2        Totals: 9   9            Presentation: Vertex    Position:   Occiput Anterior  Resuscitation Method:  Suctioning-bulb; Tactile Stimulation     Meconium Stained: None      Cord Information: 3 Vessels  Complications: Nuchal Cord Without Compressions  Cord around: shoulders  Delayed cord clamping? Yes  Cord clamped date/time:2019  4:26 PM  Disposition of Cord Blood: Lab    Blood Gases Sent?: Yes    Placenta:  Date/Time: 2019  4:29 PM  Removal: Spontaneous      Appearance: Normal     [place] Measurements:  Birth Weight: 6 lb 3.7 oz (2.825 kg)      Birth Length: 1' 6.9\" (0.48 m)      Head Circumference: 1' 1.19\" (0.335 m)      Chest Circumference: 1' 0.4\" (0.315 m)     Abdominal Girth:       Other Providers:   SHABNAM SANCHEZ;COREY GUNDERSON;ELDA SOLANO;KERRIE POMPA;NOAM MCKEON, Obstetrician;Primary Nurse;Primary [place] Nurse;Charge Nurse;Scrub Tech           Group B Strep:   Lab Results   Component Value Date/Time    GrBStrep, External Negative 2019     Information for the patient's :  Pat Small, Pending [[id number]]   No results found for: Shirley Parra, PCTDIG, BILI, ABORHEXT, ABORH  Information for the patient's :  Yadev Mullins [[id number]]   No results found for: ABORH, PCTABR, PCTDIG, BILI, ABORHEXT, ABORH    Recent Labs     19  1637   PCO2CB 38  46   PO2CB 32  23   HCO3I 22.0   SO2I 33*   IBD 5  5   PTEMPI 98.6  98.6   SPECTI VENOUS CORD  ARTERIAL CORD   PHICB 7.335  7.288   ISITE CORD  CORD   IDEV OTHER  OTHER   IALLEN NOT APPLICABLE  NOT APPLICABLE

## 2019-07-16 NOTE — H&P
History & Physical    Name: Roz Smith MRN: [id number]  SSN: [id number]    YOB: 1980  Age: 46 y.o. Sex: female      Subjective:     Estimated Date of Delivery: 19  OB History    Para Term  AB Living   5 4 3 1 0 4   SAB TAB Ectopic Molar Multiple Live Births   0 0 0 0 0 4      # Outcome Date GA Lbr Pino/2nd Weight Sex Delivery Anes PTL Lv   5 Current            4 Term 03/05/15 37w2d  5 lb 14 oz (2.665 kg) M  EPIDURAL AN N RAFAEL   3 Term 13 37w0d  5 lb 14 oz (2.665 kg) F Vag-Spont  N RAFAEL      Birth Comments: [place]   2  02/17/10 35w0d  5 lb (2.268 kg) M Vag-Spont  Y RAFAEL      Birth Comments: [place]   1 Term 08 38w0d  6 lb 13 oz (3.09 kg) F Vag-Spont  N RAFAEL      Birth Comments: [place]       Ms. Irwin is admitted with pregnancy at 38w2d for induction of labor due to advanced cervical dilation. Per MFM, ok to induct pt at this time. Prenatal course was complicated by  labor. Please see prenatal records for details.     Past Medical History:   Diagnosis Date    Abnormal Pap smear     x2, then normal after retested    Abnormal Papanicolaou smear of cervix     Anemia     denies hx of blood transfusions    Anxiety     GERD (gastroesophageal reflux disease)     omeprazole as needed    HX OTHER MEDICAL     appendectomy 2010    Hypothyroidism     Intractable migraine with aura without status migrainosus 10/1/2015    Positive H. pylori test 10/1/2015    Premature cervical dilation in third trimester 2019     delivery      labor 2019    Rh negative state in antepartum period     Rhesus isoimmunization affecting pregnancy     Thyroid activity decreased      Past Surgical History:   Procedure Laterality Date    HX APPENDECTOMY  2010     Social History     Occupational History    Not on file   Tobacco Use    Smoking status: Never Smoker    Smokeless tobacco: Never Used   Substance and Sexual Activity    Alcohol use: No    Drug use: No    Sexual activity: Yes     Partners: Male     Birth control/protection: None     Family History   Problem Relation Age of Onset    No Known Problems Mother     Hypertension Father     Elevated Lipids Father     Cancer Maternal Grandmother         ovarian       No Known Allergies  Prior to Admission medications    Medication Sig Start Date End Date Taking? Authorizing Provider   calcium carbonate (TUMS PO) Take  by mouth. Yes Provider, Historical   calcium carbonate-mag hydroxid 1,000-200 mg chew Take  by mouth. Yes Provider, Historical   cholecalciferol, vitamin D3, (VITAMIN D3 PO) Take  by mouth. Yes Provider, Historical   PNV No12-Iron-FA-DSS-OM-3 29 mg iron-1 mg -50 mg CPKD Take  by mouth. Yes Provider, Historical   levothyroxine (SYNTHROID) 75 mcg tablet TAKE 1 TABLET BY MOUTH DAILY BEFORE BREAKFAST 4/25/18  Yes Clinton Gonzalez MD   HEMOCYTE-PLUS 106 mg iron- 1 mg cap TAKE ONE CAPSULE BY MOUTH DAILY 3/19/18  Yes Clinton FLOYD MD   omeprazole (PRILOSEC) 20 mg capsule Take 20 mg by mouth daily. Yes Provider, Historical   aspirin delayed-release 81 mg tablet Take  by mouth daily. Provider, Historical        Review of Systems: A comprehensive review of systems was negative except for that written in the History of Present Illness. Objective:     Vitals:  Vitals:    07/16/19 0747 07/16/19 0803   BP: 111/64    Pulse: 84    Resp: 18    Temp: 97.8 °F (36.6 °C)    Weight:  203 lb (92.1 kg)   Height:  5' 8\" (1.727 m)        Physical Exam:  Patient without distress. Heart: Regular rate and rhythm or S1S2 present  Lung: clear to auscultation throughout lung fields, no wheezes, no rales, no rhonchi and normal respiratory effort  Abdomen: soft, nontender  Lower Extremities:  - Edema No  Membranes:  Intact   sve per rn was 6cm.    Fetal Heart Rate: Reactive          Prenatal Labs:   Lab Results   Component Value Date/Time    ABO/Rh(D) O NEGATIVE 07/09/2019 04:56 PM    Rubella, External immune 12/17/2018    HBsAg, External negative 12/17/2018    HIV, External NR 12/17/2018    RPR, External NR 12/17/2018    Gonorrhea, External negative 01/17/2019    Chlamydia, External negative 01/17/2019    ABO,Rh O negative 12/17/2018    GrBStrep, External Negative 07/01/2019       Impression/Plan:     Principal Problem:    Premature cervical dilation in third trimester (7/16/2019)    Active Problems:    38 weeks gestation of pregnancy (7/16/2019)      Advanced maternal age in multigravida (7/16/2019)      Encounter for planned induction of labor (7/16/2019)         Plan: Admit for induction of labor. Group B Strep negative. Will start pitocin.   Pt's plan is to go natural.

## 2019-07-16 NOTE — PROGRESS NOTES
Dr Ruben Berger at bedside, strip reviewed.     Orders received patient may ambulate in hallways if desires, MD to let RN know when to start pitocin

## 2019-07-16 NOTE — PROGRESS NOTES
Pt called requested SVE. As documented. MD made aware. On the way to the unit. Room set for delivery.

## 2019-07-16 NOTE — PROGRESS NOTES
Labor Progress Note  Patient seen, fetal heart rate and contraction pattern evaluated, patient examined. Pt really does not feel contractions. Patient Vitals for the past 1 hrs:   BP Pulse   07/16/19 1356 109/64 74   07/16/19 1341 110/64 87   07/16/19 1326 107/59 82   07/16/19 1311 113/66 75       Physical Exam:  Cervical Exam:  6 cm dilated    70% effaced    -2 station    Presenting Part: cephalic  Membranes:  Artificial Rupture of Membranes; Amniotic Fluid: small amount of clear fluid  Uterine Activity: difficult to tell  Fetal Heart Rate: Reactive    Assessment/Plan:  Reassuring fetal status, Continue plan for vaginal delivery.

## 2019-07-16 NOTE — PROGRESS NOTES
07/16/19 0807   Peripheral IV 07/16/19 Anterior; Left Forearm   Placement Date/Time: 07/16/19 0800   Number of Attempts: 1  Inserted By: Malena lopez RN   Present on Admission/Arrival: No  Size: 18 G  Orientation: Anterior; Left  Location: Forearm   Site Assessment Clean, dry, & intact   Phlebitis Assessment 0   Infiltration Assessment 0   Dressing Status Clean, dry, & intact   Dressing Type Tape;Transparent   Hub Color/Line Status Green; Infusing   Action Taken Blood drawn

## 2019-07-16 NOTE — PROGRESS NOTES
SBAR IN Report: Mother    Verbal report received from Daxa Gamino RN (full name & credentials) on this patient, who is now being transferred from Labor and Delivery (unit) for routine progression of care. The patient is wearing a green \"Anesthesia-Duramorph\" band. Report consisted of patient's Situation, Background, Assessment and Recommendations (SBAR).  ID bands were compared with the identification form, and verified with the patient and transferring nurse. Information from the SBAR and the [place] Report was reviewed with the transferring nurse; opportunity for questions and clarification provided.

## 2019-07-16 NOTE — PROGRESS NOTES
Pt called out requested IV pain medication. Dicussed options. Pt agrees to try Nitrous for pain management. MD agrees. Orders received.

## 2019-07-17 LAB
BLOOD BANK CMNT PATIENT-IMP: NORMAL
FETAL SCREEN,FMHS: NORMAL

## 2019-07-17 PROCEDURE — 65270000029 HC RM PRIVATE

## 2019-07-17 PROCEDURE — 36415 COLL VENOUS BLD VENIPUNCTURE: CPT

## 2019-07-17 PROCEDURE — 74011250636 HC RX REV CODE- 250/636: Performed by: OBSTETRICS & GYNECOLOGY

## 2019-07-17 PROCEDURE — 85461 HEMOGLOBIN FETAL: CPT

## 2019-07-17 PROCEDURE — 74011250637 HC RX REV CODE- 250/637: Performed by: OBSTETRICS & GYNECOLOGY

## 2019-07-17 RX ADMIN — IBUPROFEN 800 MG: 800 TABLET, FILM COATED ORAL at 00:34

## 2019-07-17 RX ADMIN — LEVOTHYROXINE SODIUM 75 MCG: 50 TABLET ORAL at 09:04

## 2019-07-17 RX ADMIN — RHO(D) IMMUNE GLOBULIN (HUMAN) 0.3 MG: 1500 SOLUTION INTRAMUSCULAR at 16:09

## 2019-07-17 NOTE — LACTATION NOTE

## 2019-07-17 NOTE — LACTATION NOTE
This note was copied from a baby's chart. Mom called out as baby showing good feeding cues and getting ready to feed. Observed mom easily latch baby onto right breast in cradle hold. Good latch observed. Baby needed some stimulation to start sucking, but did well when kept awake w/ tactile stimulation. No c/o pain. Lactation observed about 10 minutes. No able to stay longer as called away to another room. Mom knows to pump only in future as needed if spittiness surfaces again and affects latching, otherwise aim for 8-12 full breast feeding session in next 24 hrs.  Lactation to follow up in am.

## 2019-07-17 NOTE — LACTATION NOTE
This note was copied from a baby's chart. In to see mom and baby for first visit. Mom states 5th baby. Breast each of her other kids for around 4 months. Per mom, this  has been spitty at times in 1st day that has interfered w/ latching. Reviewed 1st and 2nd 24 hr feeding/output expectations. Will start pumping if baby struggles getting longer feeds in. Mom to call out next time feeding baby.

## 2019-07-17 NOTE — PROGRESS NOTES
Post-Partum Day Number 1 Progress Note    Patient doing well  without significant complaints. Pain controlled on current medication. Voiding without difficulty, normal lochia. Vitals:    Visit Vitals  /65 (BP 1 Location: Right arm, BP Patient Position: At rest)   Pulse 73   Temp 97.5 °F (36.4 °C) Comment: Had ice water   Resp 16   Ht 5' 8\" (1.727 m)   Wt 203 lb (92.1 kg)   LMP 10/21/2018   SpO2 97%   Breastfeeding? Yes   BMI 30.87 kg/m²       Vital signs stable, afebrile. Exam:  Patient without distress. Heart rrr  Lungs cta b&s               Abdomen soft, fundus firm at level of umbilicus, nontender. Lower extremities are negative for swelling, cords or tenderness. Lab Results   Component Value Date/Time    ABO Group O 12/17/2018 11:15 AM    Rh (D) Negative 12/17/2018 11:15 AM    ABO/Rh(D) Adele Pinch NEGATIVE 07/16/2019 07:57 AM        Lab Results   Component Value Date/Time    ABO/Rh(D) Adele Pinch NEGATIVE 07/16/2019 07:57 AM    Antibody screen NEG 07/16/2019 07:57 AM    Antibody screen, External negative 12/17/2018    Rubella, External immune 12/17/2018    GrBStrep, External Negative 07/01/2019    ABO,Rh O negative 12/17/2018     Lab Results   Component Value Date/Time    HGB 11.7 07/16/2019 07:57 AM    Hgb, External 13.2 12/17/2018         Assessment and Plan:  Patient appears to be having uncomplicated post-partum course. Continue routine post-delivery care and maternal education. Breastfeeding. Anticipate discharge home tomorrow.

## 2019-07-17 NOTE — PROGRESS NOTES
Safety Teaching reviewed:   1. Hand hygiene prior to handling the infant. 2. Bracelets with matching numbers are placed on mother and infant  3. An infant security tag  [facility]) is placed on the infant's ankle and monitored  4. All OB nurses wear pink Employee badges - do not give your baby to anyone without proper identification. 5. Never leave the baby alone in the room. 6. The infant should be placed on their back to sleep. on a firm mattress. No toys should be placed in the crib. (safe sleep video offered to view)  7. Never shake the baby (video offered to view)  8. Infant fall prevention - do not sleep with the baby, and place the baby in the crib while ambulating. 5. Mother and Baby Care booklet given to Mother.

## 2019-07-18 VITALS
DIASTOLIC BLOOD PRESSURE: 51 MMHG | BODY MASS INDEX: 30.77 KG/M2 | RESPIRATION RATE: 18 BRPM | SYSTOLIC BLOOD PRESSURE: 103 MMHG | HEIGHT: 68 IN | TEMPERATURE: 97.5 F | WEIGHT: 203 LBS | OXYGEN SATURATION: 96 % | HEART RATE: 62 BPM

## 2019-07-18 NOTE — DISCHARGE SUMMARY
[address] [place] Discharge Summary     Patient ID:  Alma Euceda  [id number]  14 y.o.  1980    Admit date: 2019    Discharge date and time: No discharge date for patient encounter. Admitting Physician: Russell Kuo DO     Discharge Physician: Caryn Reyes M.D. Admission Diagnoses: 38 weeks gestation of pregnancy [Z3A.38]; Advanced maternal age in multigravida [O56.36]; Encounter for planned induction of labor [Z34.90]    Problem List: Hospital - Principal Problem:    Premature cervical dilation in third trimester (2019)    Active Problems:    38 weeks gestation of pregnancy (2019)      Advanced maternal age in multigravida (2019)      Encounter for planned induction of labor (2019)     ; Other -   Patient Active Problem List   Diagnosis Code    Hypothyroidism affecting pregnancy in third trimester O99.283, E03.9    H/O  delivery, currently pregnant, second trimester O09.212    History of prior pregnancy with short cervix, currently pregnant O09.299    Elderly multigravida in second trimester O09.522    Rh negative state in antepartum period O26.899, Z73.80    Encounter for immunization Z23    Short cervix affecting pregnancy O26.879     labor O60.00    Advanced maternal age in multigravida, third trimester O36.200    45 weeks gestation of pregnancy Z3A.38    Advanced maternal age in multigravida O12.46   [facility] Encounter for planned induction of labor Z34.90    Premature cervical dilation in third trimester O34.33        Discharge Diagnoses: 38 weeks gestation of pregnancy [Z3A.38]; Advanced maternal age in multigravida [O56.36]; Encounter for planned induction of labor [Z34.90]    Hospital Course: Alma Euceda had unremarkeable progressive recovery. and Eating, ambulating, and voiding in a routine manner. Significant Diagnostic Studies: No results found for this or any previous visit (from the past 24 hour(s)).     Procedures: spontaneous vaginal delivery    Discharge Exam:  Visit Vitals  /51 (BP 1 Location: Right arm, BP Patient Position: At rest)   Pulse 62   Temp 97.5 °F (36.4 °C)   Resp 18   Ht 5' 8\" (1.727 m)   Wt 203 lb (92.1 kg)   LMP 10/21/2018   SpO2 96%   Breastfeeding? Yes   BMI 30.87 kg/m²            Extremities: normal, atraumatic, no cyanosis or edema. No DVT      Patient Instructions:   Current Discharge Medication List      CONTINUE these medications which have NOT CHANGED    Details   calcium carbonate (TUMS PO) Take  by mouth.      calcium carbonate-mag hydroxid 1,000-200 mg chew Take  by mouth. cholecalciferol, vitamin D3, (VITAMIN D3 PO) Take  by mouth. PNV No12-Iron-FA-DSS-OM-3 29 mg iron-1 mg -50 mg CPKD Take  by mouth.      levothyroxine (SYNTHROID) 75 mcg tablet TAKE 1 TABLET BY MOUTH DAILY BEFORE BREAKFAST  Qty: 90 Tab, Refills: 1    Associated Diagnoses: Hypothyroidism due to acquired atrophy of thyroid      HEMOCYTE-PLUS 106 mg iron- 1 mg cap TAKE ONE CAPSULE BY MOUTH DAILY  Qty: 90 Cap, Refills: 3    Associated Diagnoses: Iron deficiency anemia, unspecified iron deficiency anemia type      omeprazole (PRILOSEC) 20 mg capsule Take 20 mg by mouth daily. STOP taking these medications       aspirin delayed-release 81 mg tablet Comments:   Reason for Stopping:              Activity: physical activity is restricted per discharge instructions  Diet: resume normal diet  Wound Care: Keep wound clean and dry, as directed    Follow-up with [facility] in 6 weeks.     Signed:  Nakita Castellano MD  7/18/2019  8:44 AM

## 2019-07-18 NOTE — DISCHARGE INSTRUCTIONS
Patient Education        Vaginal Childbirth: Care Instructions  Your Care Instructions    Your body will slowly heal in the next few weeks. It is easy to get too tired and overwhelmed during the first weeks after your baby is born. Changes in your hormones can shift your mood without warning. You may find it hard to meet the extra demands on your energy and time. Take it easy on yourself. Follow-up care is a key part of your treatment and safety. Be sure to make and go to all appointments, and call your doctor if you are having problems. It's also a good idea to know your test results and keep a list of the medicines you take. How can you care for yourself at home? · Vaginal bleeding and cramps  ? After delivery, you will have a bloody discharge from the vagina. This will turn pink within a week and then white or yellow after about 10 days. It may last for 2 to 4 weeks or longer, until the uterus has healed. Use pads instead of tampons until you stop bleeding. ? Do not worry if you pass some blood clots, as long as they are smaller than a golf ball. If you have a tear or stitches in your vaginal area, change the pad at least every 4 hours to prevent soreness and infection. ? You may have cramps for the first few days after childbirth. These are normal and occur as the uterus shrinks to normal size. Take an over-the-counter pain medicine, such as acetaminophen (Tylenol), ibuprofen (Advil, Motrin), or naproxen (Aleve), for cramps. Read and follow all instructions on the label. Do not take aspirin, because it can cause more bleeding. ? Do not take two or more pain medicines at the same time unless the doctor told you to. Many pain medicines have acetaminophen, which is Tylenol. Too much acetaminophen (Tylenol) can be harmful. · Stitches  ? If you have stitches, they will dissolve on their own and do not need to be removed. Follow your doctor's instructions for cleaning the stitched area.   ? Put ice or a cold pack on your painful area for 10 to 20 minutes at a time, several times a day, for the first few days. Put a thin cloth between the ice and your skin. ? Sit in a few inches of warm water (sitz bath) 3 times a day and after bowel movements. The warm water helps with pain and itching. If you do not have a tub, a warm shower might help. · Breast fullness  ? Your breasts may overfill (engorge) in the first few days after delivery. To help milk flow and to relieve pain, warm your breasts in the shower or by using warm, moist towels before nursing. ? If you are not nursing, do not put warmth on your breasts or touch your breasts. Wear a tight bra or sports bra and use ice until the fullness goes away. This usually takes 2 to 3 days. ? Put ice or a cold pack on your breast after nursing to reduce swelling and pain. Put a thin cloth between the ice and your skin. · Activity  ? Eat a balanced diet. Do not try to lose weight by cutting calories. Keep taking your prenatal vitamins, or take a multivitamin. ? Get as much rest as you can. Try to take naps when your baby sleeps during the day. ? Get some exercise every day. But do not do any heavy exercise until your doctor says it is okay. ? Wait until you are healed (about 4 to 6 weeks) before you have sexual intercourse. Your doctor will tell you when it is okay to have sex. ? Talk to your doctor about birth control. You can get pregnant even before your period returns. Also, you can get pregnant while you are breastfeeding. · Mental health  ? It is normal to have some sadness, anxiety, sleeplessness, and mood swings after you go home. If you feel upset or hopeless for more than a few days or are having trouble doing the things you need to do, talk to your doctor. · Constipation and hemorrhoids  ? Drink plenty of fluids, enough so that your urine is light yellow or clear like water.  If you have kidney, heart, or liver disease and have to limit fluids, talk with your doctor before you increase the amount of fluids you drink. ? Eat plenty of fiber each day. Have a bran muffin or bran cereal for breakfast, and try eating a piece of fruit for a mid-afternoon snack. ? For painful, itchy hemorrhoids, put ice or a cold pack on the area several times a day for 10 minutes at a time. Follow this by putting a warm compress on the area for another 10 to 20 minutes or by sitting in a shallow, warm bath. When should you call for help? Call 911 anytime you think you may need emergency care. For example, call if:    · You passed out (lost consciousness).    Call your doctor now or seek immediate medical care if:    · You have severe vaginal bleeding.     · You are dizzy or lightheaded, or you feel like you may faint.     · You have a fever.     · You have new or more pain in your belly or pelvis.    Watch closely for changes in your health, and be sure to contact your doctor if:    · Your vaginal bleeding seems to be getting heavier.     · You have new or worse vaginal discharge.     · You feel sad, anxious, or hopeless for more than a few days.     · You do not get better as expected. Where can you learn more? Go to http://dann-avtar.info/. Enter C472 in the search box to learn more about \"Vaginal Childbirth: Care Instructions. \"  Current as of: September 5, 2018  Content Version: 11.9  © 4217-9636 [email or web address], [employer]. Care instructions adapted under license by "[employer] (which disclaims liability or warranty for this information). If you have questions about a medical condition or this instruction, always ask your healthcare professional. [place] [address] any warranty or liability for your use of this information.

## 2019-07-18 NOTE — PROGRESS NOTES
Chart reviewed - no needs identified.  made introduction to family and provided informational packet on  mood disorder education/resources. Patient denies any history of postpartum or generalized depression/anxiety. Family receptive to receiving information and denied any additional needs from . Family has 's contact information should any needs/questions arise.     EMILAINA Owens-FAYE  [facility]   [phone]

## 2019-07-18 NOTE — LACTATION NOTE
Mom and baby are going home today. Continue to offer the breast without restriction. Mom's milk should be fully in over the next few days. Reviewed engorgement precautions. Hand Expression has been demoed and written hand-out reviewed. As milk comes in baby will be more alert at the breast and swallows will be more obvious. Breasts may feel softer once baby has finished nursing. Baby should be back to birth weight by 3weeks of age. And then gain on average 1 oz per day for the next 2-3 months. Reviewed babies should be exclusively breastfeeding for the first 6 months and that breastfeeding should continue after introduction of appropriate complimentary foods after 6 months. Initial output should be at least 1 wet and 1 bowel movement for each day old baby is. By day 5-7 once milk is fully in baby will consistently have 6 or more soaking wet diapers and about 4 bowel movement. Some babies have a bowel movement with every feeding and some have 1-3 large bowel movements each day. Inadequate output may indicate inadequate feedings and should be reported to your Pediatrician. Bowel habits may change as baby gets older. Encouraged follow-up at Pediatrician in 1-2 days, no later than 1 week of age. Call [facility] for any questions as needed or to set up an OP visit. OP phone calls are returned within 24 hours. Community Breastfeeding Resource List given.

## 2019-08-27 PROBLEM — Z67.91 RH NEGATIVE STATE IN ANTEPARTUM PERIOD: Status: RESOLVED | Noted: 2018-12-17 | Resolved: 2019-08-27

## 2019-08-27 PROBLEM — O26.899 RH NEGATIVE STATE IN ANTEPARTUM PERIOD: Status: RESOLVED | Noted: 2018-12-17 | Resolved: 2019-08-27

## 2019-09-01 ENCOUNTER — APPOINTMENT (OUTPATIENT)
Dept: ULTRASOUND IMAGING | Age: 39
End: 2019-09-01
Attending: EMERGENCY MEDICINE
Payer: COMMERCIAL

## 2019-09-01 ENCOUNTER — HOSPITAL ENCOUNTER (EMERGENCY)
Age: 39
Discharge: HOME | End: 2019-09-01
Attending: EMERGENCY MEDICINE
Payer: COMMERCIAL

## 2019-09-01 VITALS
SYSTOLIC BLOOD PRESSURE: 116 MMHG | TEMPERATURE: 97.7 F | BODY MASS INDEX: 28.89 KG/M2 | OXYGEN SATURATION: 99 % | WEIGHT: 190 LBS | HEART RATE: 66 BPM | DIASTOLIC BLOOD PRESSURE: 80 MMHG | RESPIRATION RATE: 16 BRPM

## 2019-09-01 DIAGNOSIS — K80.20 CALCULUS OF GALLBLADDER WITHOUT CHOLECYSTITIS WITHOUT OBSTRUCTION: Primary | ICD-10-CM

## 2019-09-01 LAB
ALBUMIN SERPL-MCNC: 3.8 G/DL (ref 3.5–5)
ALBUMIN/GLOB SERPL: 1.2 {RATIO} (ref 1.2–3.5)
ALP SERPL-CCNC: 73 U/L (ref 50–136)
ALT SERPL-CCNC: 54 U/L (ref 12–65)
ANION GAP SERPL CALC-SCNC: 3 MMOL/L (ref 7–16)
AST SERPL-CCNC: 29 U/L (ref 15–37)
BASOPHILS # BLD: 0 K/UL (ref 0–0.2)
BASOPHILS NFR BLD: 1 % (ref 0–2)
BILIRUB SERPL-MCNC: 0.4 MG/DL (ref 0.2–1.1)
BUN SERPL-MCNC: 18 MG/DL (ref 6–23)
CALCIUM SERPL-MCNC: 9.1 MG/DL (ref 8.3–10.4)
CHLORIDE SERPL-SCNC: 107 MMOL/L (ref 98–107)
CO2 SERPL-SCNC: 27 MMOL/L (ref 21–32)
CREAT SERPL-MCNC: 1.07 MG/DL (ref 0.6–1)
DIFFERENTIAL METHOD BLD: ABNORMAL
EOSINOPHIL # BLD: 0.2 K/UL (ref 0–0.8)
EOSINOPHIL NFR BLD: 3 % (ref 0.5–7.8)
ERYTHROCYTE [DISTWIDTH] IN BLOOD BY AUTOMATED COUNT: 11.7 % (ref 11.9–14.6)
GLOBULIN SER CALC-MCNC: 3.2 G/DL (ref 2.3–3.5)
GLUCOSE SERPL-MCNC: 126 MG/DL (ref 65–100)
HCT VFR BLD AUTO: 39.6 % (ref 35.8–46.3)
HGB BLD-MCNC: 13.2 G/DL (ref 11.7–15.4)
IMM GRANULOCYTES # BLD AUTO: 0 K/UL (ref 0–0.5)
IMM GRANULOCYTES NFR BLD AUTO: 0 % (ref 0–5)
LIPASE SERPL-CCNC: 127 U/L (ref 73–393)
LYMPHOCYTES # BLD: 1.6 K/UL (ref 0.5–4.6)
LYMPHOCYTES NFR BLD: 25 % (ref 13–44)
MCH RBC QN AUTO: 30.3 PG (ref 26.1–32.9)
MCHC RBC AUTO-ENTMCNC: 33.3 G/DL (ref 31.4–35)
MCV RBC AUTO: 90.8 FL (ref 79.6–97.8)
MONOCYTES # BLD: 0.3 K/UL (ref 0.1–1.3)
MONOCYTES NFR BLD: 5 % (ref 4–12)
NEUTS SEG # BLD: 4.2 K/UL (ref 1.7–8.2)
NEUTS SEG NFR BLD: 66 % (ref 43–78)
NRBC # BLD: 0 K/UL (ref 0–0.2)
PLATELET # BLD AUTO: 204 K/UL (ref 150–450)
PMV BLD AUTO: 9.9 FL (ref 9.4–12.3)
POTASSIUM SERPL-SCNC: 3.7 MMOL/L (ref 3.5–5.1)
PROT SERPL-MCNC: 7 G/DL (ref 6.3–8.2)
RBC # BLD AUTO: 4.36 M/UL (ref 4.05–5.2)
SODIUM SERPL-SCNC: 137 MMOL/L (ref 136–145)
WBC # BLD AUTO: 6.3 K/UL (ref 4.3–11.1)

## 2019-09-01 PROCEDURE — 96374 THER/PROPH/DIAG INJ IV PUSH: CPT | Performed by: EMERGENCY MEDICINE

## 2019-09-01 PROCEDURE — 96375 TX/PRO/DX INJ NEW DRUG ADDON: CPT | Performed by: EMERGENCY MEDICINE

## 2019-09-01 PROCEDURE — 83690 ASSAY OF LIPASE: CPT

## 2019-09-01 PROCEDURE — 85025 COMPLETE CBC W/AUTO DIFF WBC: CPT

## 2019-09-01 PROCEDURE — 99283 EMERGENCY DEPT VISIT LOW MDM: CPT | Performed by: EMERGENCY MEDICINE

## 2019-09-01 PROCEDURE — 74011250636 HC RX REV CODE- 250/636: Performed by: EMERGENCY MEDICINE

## 2019-09-01 PROCEDURE — 80053 COMPREHEN METABOLIC PANEL: CPT

## 2019-09-01 PROCEDURE — 76705 ECHO EXAM OF ABDOMEN: CPT

## 2019-09-01 RX ORDER — ONDANSETRON 4 MG/1
4 TABLET, ORALLY DISINTEGRATING ORAL
Qty: 11 TAB | Refills: 0 | Status: SHIPPED | OUTPATIENT
Start: 2019-09-01 | End: 2020-01-31

## 2019-09-01 RX ORDER — ONDANSETRON 2 MG/ML
4 INJECTION INTRAMUSCULAR; INTRAVENOUS
Status: COMPLETED | OUTPATIENT
Start: 2019-09-01 | End: 2019-09-01

## 2019-09-01 RX ORDER — HYDROCODONE BITARTRATE AND ACETAMINOPHEN 5; 325 MG/1; MG/1
1 TABLET ORAL
Qty: 11 TAB | Refills: 0 | Status: SHIPPED | OUTPATIENT
Start: 2019-09-01 | End: 2019-09-04

## 2019-09-01 RX ORDER — MORPHINE SULFATE 2 MG/ML
4 INJECTION, SOLUTION INTRAMUSCULAR; INTRAVENOUS
Status: COMPLETED | OUTPATIENT
Start: 2019-09-01 | End: 2019-09-01

## 2019-09-01 RX ADMIN — MORPHINE SULFATE 4 MG: 2 INJECTION, SOLUTION INTRAMUSCULAR; INTRAVENOUS at 05:30

## 2019-09-01 RX ADMIN — ONDANSETRON 4 MG: 2 INJECTION INTRAMUSCULAR; INTRAVENOUS at 05:27

## 2019-09-01 NOTE — ED TRIAGE NOTES
Pt c/o mid back pain that radiated up to R shoulder and around to RUQ with nausea. Pt concerned it may be her gallbladder.

## 2019-09-01 NOTE — ED PROVIDER NOTES
[address] My[address]am is a 46 y.o. female seen on 9/1/2019 at 5:18 AM in the [facility] EMERGENCY DEPT in room ER05/05. Chief Complaint   Patient presents with    Back Pain    Abdominal Pain     HPI: 43-year-old female presenting to the emergency department complaining of right upper quadrant abdominal pain and pain in her epigastrium. She states that around 1 AM she was woken up by pain. Initially the pain was in her back right shoulder blade. But then it began to settle into the epigastrium radiating through to her back. She also having pain in the right upper quadrant. She notes nausea but no vomiting. She states she has had 2 other episodes of pain that felt similar to this earlier in the week. Both times it happened in the night. Prior to that she had symptoms 9 years ago after having her child. She is postpartum with recent delivery of a healthy baby    Historian: patient    REVIEW OF SYSTEMS     Review of Systems   Constitutional: Negative for fever. HENT: Negative. Eyes: Negative. Respiratory: Negative for cough, chest tightness, shortness of breath and wheezing. Cardiovascular: Negative for chest pain. Gastrointestinal: Positive for abdominal pain and nausea. Negative for abdominal distention, constipation, diarrhea and vomiting. Endocrine: Negative. Genitourinary: Negative for dysuria, flank pain, frequency and urgency. Neurological: Negative for dizziness, syncope and headaches. Psychiatric/Behavioral: Negative. All other systems reviewed and are negative.       PAST MEDICAL HISTORY     Past Medical History:   Diagnosis Date    Abnormal Pap smear     x2, then normal after retested    Abnormal Papanicolaou smear of cervix     Anemia     denies hx of blood transfusions    Anxiety     GERD (gastroesophageal reflux disease)     omeprazole as needed    HX OTHER MEDICAL     appendectomy 2010    Hypothyroidism     Intractable migraine with aura without status migrainosus 10/1/2015    Positive H. pylori test 10/1/2015    Premature cervical dilation in third trimester 2019     delivery      labor 2019    Rh negative state in antepartum period     Rhesus isoimmunization affecting pregnancy     Thyroid activity decreased      Past Surgical History:   Procedure Laterality Date    HX APPENDECTOMY  2010    HX WISDOM TEETH EXTRACTION       Social History     Socioeconomic History    Marital status:      Spouse name: Not on file    Number of children: Not on file    Years of education: Not on file    Highest education level: Some college, no degree   Tobacco Use    Smoking status: Never Smoker    Smokeless tobacco: Never Used   Substance and Sexual Activity    Alcohol use: No    Drug use: No    Sexual activity: Yes     Partners: Male     Birth control/protection: None     Comment: Vasectomy   Other Topics Concern     Prior to Admission Medications   Prescriptions Last Dose Informant Patient Reported? Taking? HEMOCYTE-PLUS 106 mg iron- 1 mg cap   No No   Sig: TAKE ONE CAPSULE BY MOUTH DAILY   levothyroxine (SYNTHROID) 75 mcg tablet   No Yes   Sig: TAKE 1 TABLET BY MOUTH DAILY BEFORE BREAKFAST      Facility-Administered Medications: None     No Known Allergies     PHYSICAL EXAM       Vitals:    19 0447   BP: 117/82   Pulse: 67   Resp: 18   Temp: 97.7 °F (36.5 °C)   SpO2: 98%    Vital signs were reviewed. Physical Exam   Constitutional: She is oriented to person, place, and time. She appears well-developed and well-nourished. No distress. HENT:   Head: Normocephalic and atraumatic. Eyes: Pupils are equal, round, and reactive to light. EOM are normal.   Neck: Normal range of motion. Neck supple. Cardiovascular: Normal rate, regular rhythm, normal heart sounds and intact distal pulses. Exam reveals no gallop and no friction rub. No murmur heard.   Pulmonary/Chest: Effort normal and breath sounds normal. No stridor. No respiratory distress. She has no wheezes. Abdominal: Soft. Bowel sounds are normal. She exhibits no distension and no mass. There is tenderness in the right upper quadrant and epigastric area. There is positive Rowley's sign. There is no rigidity, no rebound, no guarding, no CVA tenderness and no tenderness at McBurney's point. Musculoskeletal: Normal range of motion. She exhibits no edema, tenderness or deformity. Neurological: She is alert and oriented to person, place, and time. No sensory deficit. No focal neuro deficits   Skin: Skin is warm and dry. No rash noted. She is not diaphoretic. No erythema. Psychiatric: She has a normal mood and affect. Her behavior is normal.   Vitals reviewed. MEDICAL DECISION MAKING     Differential Diagnosis: biliary colic, cholecystitis, choledocholithiasis, gerd, gastritis, pancreatitis    MDM  Number of Diagnoses or Management Options     Amount and/or Complexity of Data Reviewed  Clinical lab tests: ordered and reviewed  Tests in the radiology section of CPT®: ordered and reviewed  Review and summarize past medical records: yes    Risk of Complications, Morbidity, and/or Mortality  Presenting problems: high  Diagnostic procedures: high  Management options: high      Procedures    ED Course:  Hx and exam most concerning for biliary colic    8:61 AM  Ultrasound shows cholelithiasis without cholecystitis. Recommend symptomatic treatment, follow-up with general surgery. Return precautions given. Patient is comfortable with this plan. Disposition: Discharge home  Diagnosis: Cholelithiasis  ____________________________________________________________________  A portion of this note was generated using voice recognition dictation software.  While the note has been reviewed for accuracy, please note certain words and phrases may not be transcribed as intended and some grammatical and/or typographical errors may be present.

## 2019-09-04 ENCOUNTER — HOSPITAL ENCOUNTER (EMERGENCY)
Age: 39
Discharge: HOME | End: 2019-09-04
Attending: EMERGENCY MEDICINE
Payer: COMMERCIAL

## 2019-09-04 VITALS
RESPIRATION RATE: 16 BRPM | SYSTOLIC BLOOD PRESSURE: 104 MMHG | BODY MASS INDEX: 28.79 KG/M2 | DIASTOLIC BLOOD PRESSURE: 73 MMHG | HEIGHT: 68 IN | WEIGHT: 190 LBS | TEMPERATURE: 97.7 F | HEART RATE: 58 BPM | OXYGEN SATURATION: 100 %

## 2019-09-04 DIAGNOSIS — K80.20 CALCULUS OF GALLBLADDER WITHOUT CHOLECYSTITIS WITHOUT OBSTRUCTION: Primary | ICD-10-CM

## 2019-09-04 LAB
ALBUMIN SERPL-MCNC: 4.3 G/DL (ref 3.5–5)
ALBUMIN/GLOB SERPL: 1.1 {RATIO} (ref 1.2–3.5)
ALP SERPL-CCNC: 84 U/L (ref 50–136)
ALT SERPL-CCNC: 63 U/L (ref 12–65)
ANION GAP SERPL CALC-SCNC: 4 MMOL/L (ref 7–16)
AST SERPL-CCNC: 35 U/L (ref 15–37)
BASOPHILS # BLD: 0 K/UL (ref 0–0.2)
BASOPHILS NFR BLD: 1 % (ref 0–2)
BILIRUB SERPL-MCNC: 0.4 MG/DL (ref 0.2–1.1)
BUN SERPL-MCNC: 8 MG/DL (ref 6–23)
CALCIUM SERPL-MCNC: 9.5 MG/DL (ref 8.3–10.4)
CHLORIDE SERPL-SCNC: 106 MMOL/L (ref 98–107)
CO2 SERPL-SCNC: 27 MMOL/L (ref 21–32)
CREAT SERPL-MCNC: 1.07 MG/DL (ref 0.6–1)
DIFFERENTIAL METHOD BLD: ABNORMAL
EOSINOPHIL # BLD: 0 K/UL (ref 0–0.8)
EOSINOPHIL NFR BLD: 1 % (ref 0.5–7.8)
ERYTHROCYTE [DISTWIDTH] IN BLOOD BY AUTOMATED COUNT: 11.9 % (ref 11.9–14.6)
GLOBULIN SER CALC-MCNC: 3.8 G/DL (ref 2.3–3.5)
GLUCOSE SERPL-MCNC: 119 MG/DL (ref 65–100)
HCT VFR BLD AUTO: 41 % (ref 35.8–46.3)
HGB BLD-MCNC: 14.2 G/DL (ref 11.7–15.4)
IMM GRANULOCYTES # BLD AUTO: 0 K/UL (ref 0–0.5)
IMM GRANULOCYTES NFR BLD AUTO: 0 % (ref 0–5)
LIPASE SERPL-CCNC: 70 U/L (ref 73–393)
LYMPHOCYTES # BLD: 1 K/UL (ref 0.5–4.6)
LYMPHOCYTES NFR BLD: 11 % (ref 13–44)
MCH RBC QN AUTO: 31.5 PG (ref 26.1–32.9)
MCHC RBC AUTO-ENTMCNC: 34.6 G/DL (ref 31.4–35)
MCV RBC AUTO: 90.9 FL (ref 79.6–97.8)
MONOCYTES # BLD: 0.2 K/UL (ref 0.1–1.3)
MONOCYTES NFR BLD: 2 % (ref 4–12)
NEUTS SEG # BLD: 7.3 K/UL (ref 1.7–8.2)
NEUTS SEG NFR BLD: 85 % (ref 43–78)
NRBC # BLD: 0 K/UL (ref 0–0.2)
PLATELET # BLD AUTO: 217 K/UL (ref 150–450)
PMV BLD AUTO: 10.1 FL (ref 9.4–12.3)
POTASSIUM SERPL-SCNC: 4 MMOL/L (ref 3.5–5.1)
PROT SERPL-MCNC: 8.1 G/DL (ref 6.3–8.2)
RBC # BLD AUTO: 4.51 M/UL (ref 4.05–5.2)
SODIUM SERPL-SCNC: 137 MMOL/L (ref 136–145)
WBC # BLD AUTO: 8.6 K/UL (ref 4.3–11.1)

## 2019-09-04 PROCEDURE — 99283 EMERGENCY DEPT VISIT LOW MDM: CPT | Performed by: EMERGENCY MEDICINE

## 2019-09-04 PROCEDURE — 74011250636 HC RX REV CODE- 250/636: Performed by: EMERGENCY MEDICINE

## 2019-09-04 PROCEDURE — 96374 THER/PROPH/DIAG INJ IV PUSH: CPT | Performed by: EMERGENCY MEDICINE

## 2019-09-04 PROCEDURE — 96361 HYDRATE IV INFUSION ADD-ON: CPT | Performed by: EMERGENCY MEDICINE

## 2019-09-04 PROCEDURE — 83690 ASSAY OF LIPASE: CPT

## 2019-09-04 PROCEDURE — 80053 COMPREHEN METABOLIC PANEL: CPT

## 2019-09-04 PROCEDURE — 85025 COMPLETE CBC W/AUTO DIFF WBC: CPT

## 2019-09-04 PROCEDURE — 96375 TX/PRO/DX INJ NEW DRUG ADDON: CPT | Performed by: EMERGENCY MEDICINE

## 2019-09-04 RX ORDER — ONDANSETRON 2 MG/ML
4 INJECTION INTRAMUSCULAR; INTRAVENOUS
Status: COMPLETED | OUTPATIENT
Start: 2019-09-04 | End: 2019-09-04

## 2019-09-04 RX ORDER — SODIUM CHLORIDE, SODIUM LACTATE, POTASSIUM CHLORIDE, CALCIUM CHLORIDE 600; 310; 30; 20 MG/100ML; MG/100ML; MG/100ML; MG/100ML
1000 INJECTION, SOLUTION INTRAVENOUS CONTINUOUS
Status: DISCONTINUED | OUTPATIENT
Start: 2019-09-04 | End: 2019-09-04 | Stop reason: HOSPADM

## 2019-09-04 RX ORDER — MORPHINE SULFATE 15 MG/1
15 TABLET ORAL
Qty: 12 TAB | Refills: 0 | Status: SHIPPED | OUTPATIENT
Start: 2019-09-04 | End: 2019-09-07

## 2019-09-04 RX ORDER — HYDROMORPHONE HYDROCHLORIDE 1 MG/ML
1 INJECTION, SOLUTION INTRAMUSCULAR; INTRAVENOUS; SUBCUTANEOUS
Status: COMPLETED | OUTPATIENT
Start: 2019-09-04 | End: 2019-09-04

## 2019-09-04 RX ADMIN — HYDROMORPHONE HYDROCHLORIDE 1 MG: 1 INJECTION, SOLUTION INTRAMUSCULAR; INTRAVENOUS; SUBCUTANEOUS at 11:58

## 2019-09-04 RX ADMIN — ONDANSETRON 4 MG: 2 INJECTION INTRAMUSCULAR; INTRAVENOUS at 11:57

## 2019-09-04 RX ADMIN — SODIUM CHLORIDE, SODIUM LACTATE, POTASSIUM CHLORIDE, AND CALCIUM CHLORIDE 1000 ML: 600; 310; 30; 20 INJECTION, SOLUTION INTRAVENOUS at 12:04

## 2019-09-04 NOTE — ED NOTES
I have reviewed discharge instructions with the patient. The patient verbalized understanding. Patient left ED via Discharge Method: ambulatory to Home with family    Opportunity for questions and clarification provided. Patient given 1 scripts. To continue your aftercare when you leave the hospital, you may receive an automated call from our care team to check in on how you are doing. This is a free service and part of our promise to provide the best care and service to meet your aftercare needs.  If you have questions, or wish to unsubscribe from this service please call [phone]. Thank you for Choosing our [facility] Emergency Department.

## 2019-09-04 NOTE — ED PROVIDER NOTES
32-year-old female with history of gallstones presents with severe epigastric pain radiating to her right upper quadrant and back since 3 AM.  She was seen in the emergency department 3 days ago for the same. Ultrasound showed uncomplicated cholelithiasis. She has follow-up with general surgery, Kaitlynn Desir, tomorrow. She is out of Norco.  She had a natural vaginal delivery 7 weeks ago without complication. Still has some vaginal spotting. She is breast-feeding. Denies fevers or chills. She has nausea with multiple vomiting, most recently with black emesis. She has been unable to eat. Denies urinary symptoms.            Past Medical History:   Diagnosis Date    Abnormal Pap smear     x2, then normal after retested    Abnormal Papanicolaou smear of cervix     Anemia     denies hx of blood transfusions    Anxiety     GERD (gastroesophageal reflux disease)     omeprazole as needed    HX OTHER MEDICAL     appendectomy     Hypothyroidism     Intractable migraine with aura without status migrainosus 10/1/2015    Positive H. pylori test 10/1/2015    Premature cervical dilation in third trimester 2019     delivery      labor 2019    Rh negative state in antepartum period     Rhesus isoimmunization affecting pregnancy     Thyroid activity decreased        Past Surgical History:   Procedure Laterality Date    HX APPENDECTOMY      HX WISDOM TEETH EXTRACTION           Family History:   Problem Relation Age of Onset    No Known Problems Mother     Hypertension Father     Elevated Lipids Father     Ovarian Cancer Maternal Grandmother         Later in life    No Known Problems Maternal Grandfather     No Known Problems Paternal Grandmother     Emphysema Paternal Grandfather         Non- Smoker       Social History     Socioeconomic History    Marital status:      Spouse name: Not on file    Number of children: Not on file    Years of education: Not on file   Sofiya Highest education level: Some college, no degree   Occupational History    Not on file   Social Needs    Financial resource strain: Not on file    Food insecurity:     Worry: Not on file     Inability: Not on file    Transportation needs:     Medical: Not on file     Non-medical: Not on file   Tobacco Use    Smoking status: Never Smoker    Smokeless tobacco: Never Used   Substance and Sexual Activity    Alcohol use: No    Drug use: No    Sexual activity: Yes     Partners: Male     Birth control/protection: None     Comment: Vasectomy   Lifestyle    Physical activity:     Days per week: Not on file     Minutes per session: Not on file    Stress: Not on file   Relationships    Social connections:     Talks on phone: Not on file     Gets together: Not on file     Attends Buddhism service: Not on file     Active member of club or organization: Not on file     Attends meetings of clubs or organizations: Not on file     Relationship status: Not on file    Intimate partner violence:     Fear of current or ex partner: Not on file     Emotionally abused: Not on file     Physically abused: Not on file     Forced sexual activity: Not on file   Other Topics Concern     Service Not Asked    Blood Transfusions Not Asked    Caffeine Concern Not Asked    Occupational Exposure Not Asked   [place] Hazards Not Asked    Sleep Concern Not Asked    Stress Concern Not Asked    Weight Concern Not Asked    Special Diet Not Asked    Back Care Not Asked    Exercise Not Asked    Bike Helmet Not Asked    Seat Belt Not Asked    Self-Exams Not Asked   Social History Narrative    Not on file         ALLERGIES: Patient has no known allergies. Review of Systems   Constitutional: Positive for fatigue. Negative for chills and fever. HENT: Negative for hearing loss. Eyes: Negative for visual disturbance. Respiratory: Negative for cough and shortness of breath.     Cardiovascular: Negative for chest pain and palpitations. Gastrointestinal: Positive for abdominal pain, nausea and vomiting. Negative for diarrhea. Genitourinary: Positive for vaginal bleeding. Negative for dysuria. Musculoskeletal: Negative for back pain. Skin: Negative for rash. Neurological: Negative for weakness and headaches. Psychiatric/Behavioral: Negative for confusion. Vitals:    09/04/19 1054   BP: 110/76   Pulse: 60   Resp: 16   Temp: 97.7 °F (36.5 °C)   SpO2: 99%   Weight: 86.2 kg (190 lb)   Height: 5' 8\" (1.727 m)            Physical Exam   Constitutional: She appears well-developed and well-nourished. She appears ill. HENT:   Head: Normocephalic and atraumatic. Eyes: Pupils are equal, round, and reactive to light. EOM are normal.   Neck: Normal range of motion. Neck supple. Cardiovascular: Regular rhythm and normal heart sounds. Pulmonary/Chest: Effort normal and breath sounds normal.   Abdominal: Soft. There is tenderness in the right upper quadrant and epigastric area. There is guarding and positive Rowley's sign. Musculoskeletal: Normal range of motion. Neurological: She is alert. Skin: Skin is warm and dry. Psychiatric: Her behavior is normal.   Nursing note and vitals reviewed. MDM  Number of Diagnoses or Management Options  Diagnosis management comments: Parts of this document were created using dragon voice recognition software. The chart has been reviewed but errors may still be present. 12:41 PM  Pain resolved. Labs still unremarkable without signs of cholecystitis or obstruction. Has follow-up with surgery tomorrow. I discussed the results of all labs, procedures, radiographs, and treatments with the patient and available family. Treatment plan is agreed upon and the patient is ready for discharge. Questions about treatment in the ED and differential diagnosis of presenting condition were answered.   Patient was given verbal discharge instructions including, but not limited to, importance of returning to the emergency department for any concern of worsening or continued symptoms. Instructions were given to follow up with a primary care provider or specialist within 1-2 days. Adverse effects of medications, if prescribed, were discussed and patient was advised to refrain from significant physical activity until followed up by primary care physician and to not drive or operate heavy machinery after taking any sedating substances. Amount and/or Complexity of Data Reviewed  Clinical lab tests: ordered and reviewed (Results for orders placed or performed during the hospital encounter of 09/04/19  -CBC WITH AUTOMATED DIFF       Result                      Value             Ref Range           WBC                         8.6               4.3 - 11.1 K*       RBC                         4.51              4.05 - 5.2 M*       HGB                         14.2              11.7 - 15.4 *       HCT                         41.0              35.8 - 46.3 %       MCV                         90.9              79.6 - 97.8 *       MCH                         31.5              26.1 - 32.9 *       MCHC                        34.6              31.4 - 35.0 *       RDW                         11.9              11.9 - 14.6 %       PLATELET                    217               150 - 450 K/*       MPV                         10.1              9.4 - 12.3 FL       ABSOLUTE NRBC               0.00              0.0 - 0.2 K/*       DF                          AUTOMATED                             NEUTROPHILS                 85 (H)            43 - 78 %           LYMPHOCYTES                 11 (L)            13 - 44 %           MONOCYTES                   2 (L)             4.0 - 12.0 %        EOSINOPHILS                 1                 0.5 - 7.8 %         BASOPHILS                   1                 0.0 - 2.0 %         IMMATURE GRANULOCYTES       0                 0.0 - 5.0 %         ABS.  NEUTROPHILS 7.3               1.7 - 8.2 K/*       ABS. LYMPHOCYTES            1.0               0.5 - 4.6 K/*       ABS. MONOCYTES              0.2               0.1 - 1.3 K/*       ABS. EOSINOPHILS            0.0               0.0 - 0.8 K/*       ABS. BASOPHILS              0.0               0.0 - 0.2 K/*       ABS. IMM. GRANS.            0.0               0.0 - 0.5 K/*  -METABOLIC PANEL, COMPREHENSIVE       Result                      Value             Ref Range           Sodium                      137               136 - 145 mm*       Potassium                   4.0               3.5 - 5.1 mm*       Chloride                    106               98 - 107 mmo*       CO2                         27                21 - 32 mmol*       Anion gap                   4 (L)             7 - 16 mmol/L       Glucose                     119 (H)           65 - 100 mg/*       BUN                         8                 6 - 23 MG/DL        Creatinine                  1. 07 (H)          0.6 - 1.0 MG*       GFR est AA                  >60               >60 ml/min/1*       GFR est non-AA              >60               >60 ml/min/1*       Calcium                     9.5               8.3 - 10.4 M*       Bilirubin, total            0.4               0.2 - 1.1 MG*       ALT (SGPT)                  63                12 - 65 U/L         AST (SGOT)                  35                15 - 37 U/L         Alk.  phosphatase            84                50 - 136 U/L        Protein, total              8.1               6.3 - 8.2 g/*       Albumin                     4.3               3.5 - 5.0 g/*       Globulin                    3.8 (H)           2.3 - 3.5 g/*       A-G Ratio                   1.1 (L)           1.2 - 3.5      -LIPASE       Result                      Value             Ref Range           Lipase                      70 (L)            73 - 393 U/L   )  Tests in the medicine section of CPT®: reviewed and ordered Procedures

## 2019-09-04 NOTE — ED TRIAGE NOTES
Patient was here on 9/1/2019 diagnosed with gallstones, following up with surgeon however pain is worse and vomiting black substances. Patient with recent childbirth.

## 2019-09-05 NOTE — H&P (VIEW-ONLY)
Maurice Whitlock MD, [facility], [address] [place] [address]  Maxim Mena  Phone [phone]   Fax [phone]      Date of visit: 9/5/2019     Primary/Requesting provider: Deborah Holden MD    Chief Complaint   Patient presents with    New Patient     gallstones          HISTORY OF [address] is a 46 y.o. female. New Patient   Associated symptoms include abdominal pain and headaches. Patient is a 46 y.o. female who presents for consideration of cholecystectomy, in f/u from recent ER evaluation. She reports symptoms developed by and large after childbirth 7 weeks ago, although she had 1 episode while still pregnant. Symptoms seem to start with pain in the right scapular region that moves to the mid back then radiates anteriorly into the subxiphoid region. This is associated with nausea and vomiting. Symptoms have usually lasted about an hour, but a few episodes lasted up to 6 hours. However, the episode yesterday that triggered ER evaluation seemed to last upwards of 24 hours. Triggers seem to be fried foods, red meat. She's not had fever, chills, diarrhea, or jaundice. She is currently breast feeding her infant (baby #5)    Medications:   No current facility-administered medications for this visit. No current outpatient medications on file.      Facility-Administered Medications Ordered in Other Visits   Medication    lidocaine (XYLOCAINE) 10 mg/mL (1 %) injection 0.1 mL    lactated Ringers infusion 1,000 mL    lactated ringers bolus infusion 500 mL    fentaNYL citrate (PF) injection 100 mcg    midazolam (VERSED) injection 2 mg        Allergies: No Known Allergies     Past History:  Past Medical History:   Diagnosis Date    Abnormal Pap smear     x2, then normal after retested    Abnormal Papanicolaou smear of cervix     Anemia     denies hx of blood transfusions    Anxiety     Breast feeding status of mother 09/06/2019 currently breast feeding infant     GERD (gastroesophageal reflux disease)     omeprazole as needed- per patient has resolved     HX OTHER MEDICAL     appendectomy 2010    Hypothyroidism     Intractable migraine with aura without status migrainosus 10/1/2015    Positive H. pylori test 10/1/2015    Premature cervical dilation in third trimester 2019     delivery      labor 2019    Rh negative state in antepartum period     Rhesus isoimmunization affecting pregnancy     Thyroid activity decreased     levothyroxine      Past Surgical History:   Procedure Laterality Date    HX APPENDECTOMY      HX WISDOM TEETH EXTRACTION          Family and Social History:  Family History   Problem Relation Age of Onset    Cancer Mother         melanoma    Hypertension Father     Elevated Lipids Father     Ovarian Cancer Maternal Grandmother         Later in life    No Known Problems Maternal Grandfather     No Known Problems Paternal Grandmother     Emphysema Paternal Grandfather         Non- Smoker     Social History     Socioeconomic History    Marital status:      Spouse name: Not on file    Number of children: Not on file    Years of education: Not on file    Highest education level: Some college, no degree   Occupational History    Not on file   Social Needs    Financial resource strain: Not on file    Food insecurity:     Worry: Not on file     Inability: Not on file    Transportation needs:     Medical: Not on file     Non-medical: Not on file   Tobacco Use    Smoking status: Never Smoker    Smokeless tobacco: Never Used   Substance and Sexual Activity    Alcohol use: No    Drug use: No    Sexual activity: Yes     Partners: Male     Birth control/protection: None     Comment: Vasectomy   Lifestyle    Physical activity:     Days per week: Not on file     Minutes per session: Not on file    Stress: Not on file   Relationships    Social connections:     Talks on phone: Not on file     Gets together: Not on file     Attends Confucianist service: Not on file     Active member of club or organization: Not on file     Attends meetings of clubs or organizations: Not on file     Relationship status: Not on file    Intimate partner violence:     Fear of current or ex partner: Not on file     Emotionally abused: Not on file     Physically abused: Not on file     Forced sexual activity: Not on file   Other Topics Concern     Service Not Asked    Blood Transfusions Not Asked    Caffeine Concern Not Asked    Occupational Exposure Not Asked   [place] Hazards Not Asked    Sleep Concern Not Asked    Stress Concern Not Asked    Weight Concern Not Asked    Special Diet Not Asked    Back Care Not Asked    Exercise Not Asked    Bike Helmet Not Asked   [address] Not Asked    Self-Exams Not Asked   Social History Narrative    Not on file       Review of Systems   Constitutional: Positive for chills and weight loss. HENT: Negative. Eyes:        Wears glasses   Respiratory: Negative. Cardiovascular: Negative. Gastrointestinal: Positive for abdominal pain, nausea and vomiting. Genitourinary: Negative. Musculoskeletal: Positive for back pain. Skin: Negative. Neurological: Positive for headaches. Endo/Heme/Allergies: Negative. Psychiatric/Behavioral: Negative. Physical Exam   Constitutional: She appears well-developed and well-nourished. She is cooperative. Non-toxic appearance. HENT:   Head: Normocephalic and atraumatic. Mouth/Throat: Oropharynx is clear and moist.   Eyes: Pupils are equal, round, and reactive to light. Conjunctivae and EOM are normal. No scleral icterus. Neck: Normal range of motion. No JVD present. No tracheal deviation present. No thyromegaly present. Cardiovascular: Normal rate, regular rhythm and normal heart sounds. Exam reveals no gallop and no friction rub. No murmur heard.   Pulmonary/Chest: Effort normal and breath sounds normal. No respiratory distress. She has no wheezes. She has no rales. Abdominal: Soft. Bowel sounds are normal. She exhibits no distension. There is tenderness (RUQ/epigastrum > right mid abdomen. ). There is guarding. There is no rebound. Residual post-partum abdominal wall laxity   Musculoskeletal:   No gross deformities   Neurological: She is alert. No cranial nerve deficit. Skin: Skin is warm and dry. She is not diaphoretic. Psychiatric: She has a normal mood and affect. Her behavior is normal. Thought content normal.   Vitals reviewed. DATA:  Lab Results   Component Value Date/Time    ALT (SGPT) 63 09/04/2019 11:53 AM    AST (SGOT) 35 09/04/2019 11:53 AM    Alk. phosphatase 84 09/04/2019 11:53 AM    Bilirubin, total 0.4 09/04/2019 11:53 AM      Lab Results   Component Value Date/Time    WBC 8.6 09/04/2019 11:53 AM      US Results (most recent):  Results from [place] encounter on 09/01/19   US ABD LTD    Narrative EXAM: Limited abdomen ultrasound. INDICATION: Pain. COMPARISON: None. TECHNIQUE: Standard protocol limited right upper quadrant abdomen ultrasound. FINDINGS:    - Liver: Within normal limits. - Gallbladder: There are several small mobile gallstones within the gallbladder. No gallbladder wall thickening or surrounding fluid is seen. - Bile ducts: Within normal limits. The common bile duct measures 3.2 mm.  - Pancreas: Within normal limits. - Right kidney: Within normal limits. - Aorta and IVC: Within normal limits. - Portal vein: Within normal limits.  - Other: No ascites. Impression IMPRESSION: Cholelithiasis. ASSESSMENT and PLAN  Encounter Diagnoses   Name Primary?  Gallstones Yes    Patient is a currently breast-feeding mother      Typical biliary colic. Cholecystectomy warranted. Discussed disposing of breast milk x 24 hrs. Will proceed with cholecystectomy.   Technical details of the procedure are reviewed, including the non-standard single incision (SILS) technique if indicated. Risks reviewed include anesthetic risks, bleeding, infection, visceral injury including bile leak, incomplete symptom resolution and persistent post-operative diarrhea as well as conversion to open technique. All questions are answered.

## 2019-09-08 ENCOUNTER — ANESTHESIA EVENT (OUTPATIENT)
Dept: SURGERY | Age: 39
End: 2019-09-08
Payer: COMMERCIAL

## 2019-09-09 ENCOUNTER — HOSPITAL ENCOUNTER (OUTPATIENT)
Age: 39
Setting detail: OUTPATIENT SURGERY
Discharge: HOME | End: 2019-09-09
Attending: SURGERY | Admitting: SURGERY
Payer: COMMERCIAL

## 2019-09-09 ENCOUNTER — ANESTHESIA (OUTPATIENT)
Dept: SURGERY | Age: 39
End: 2019-09-09
Payer: COMMERCIAL

## 2019-09-09 VITALS
HEIGHT: 68 IN | TEMPERATURE: 98 F | OXYGEN SATURATION: 98 % | RESPIRATION RATE: 10 BRPM | BODY MASS INDEX: 27.4 KG/M2 | SYSTOLIC BLOOD PRESSURE: 97 MMHG | WEIGHT: 180.8 LBS | HEART RATE: 69 BPM | DIASTOLIC BLOOD PRESSURE: 76 MMHG

## 2019-09-09 DIAGNOSIS — Z23 ENCOUNTER FOR IMMUNIZATION: Primary | ICD-10-CM

## 2019-09-09 LAB — HCG UR QL: NEGATIVE

## 2019-09-09 PROCEDURE — 77030008522 HC TBNG INSUF LAPRO STRY -B: Performed by: SURGERY

## 2019-09-09 PROCEDURE — 88304 TISSUE EXAM BY PATHOLOGIST: CPT

## 2019-09-09 PROCEDURE — 76210000020 HC REC RM PH II FIRST 0.5 HR: Performed by: SURGERY

## 2019-09-09 PROCEDURE — 77030033269 HC SLV COMPR SCD KNE2 CARD -B: Performed by: SURGERY

## 2019-09-09 PROCEDURE — 74011250637 HC RX REV CODE- 250/637: Performed by: ANESTHESIOLOGY

## 2019-09-09 PROCEDURE — 74011250636 HC RX REV CODE- 250/636

## 2019-09-09 PROCEDURE — 77030002966 HC SUT PDS J&J -A: Performed by: SURGERY

## 2019-09-09 PROCEDURE — 77030007955 HC PCH ENDOSC SPEC J&J -B: Performed by: SURGERY

## 2019-09-09 PROCEDURE — 77030010513 HC APPL CLP LIG J&J -C: Performed by: SURGERY

## 2019-09-09 PROCEDURE — 77030037088 HC TUBE ENDOTRACH ORAL NSL COVD-A: Performed by: ANESTHESIOLOGY

## 2019-09-09 PROCEDURE — 77030012770 HC TRCR OPT FX AMR -B: Performed by: SURGERY

## 2019-09-09 PROCEDURE — 77030008756 HC TU IRR SUC STRY -B: Performed by: SURGERY

## 2019-09-09 PROCEDURE — 81025 URINE PREGNANCY TEST: CPT

## 2019-09-09 PROCEDURE — 74011000250 HC RX REV CODE- 250

## 2019-09-09 PROCEDURE — 77030039425 HC BLD LARYNG TRULITE DISP TELE -A: Performed by: ANESTHESIOLOGY

## 2019-09-09 PROCEDURE — 77030019908 HC STETH ESOPH SIMS -A: Performed by: ANESTHESIOLOGY

## 2019-09-09 PROCEDURE — 77030018836 HC SOL IRR NACL ICUM -A: Performed by: SURGERY

## 2019-09-09 PROCEDURE — 74011000250 HC RX REV CODE- 250: Performed by: SURGERY

## 2019-09-09 PROCEDURE — 77030029140 HC TRCR ENDOSC Z THRD SLV AMR -B: Performed by: SURGERY

## 2019-09-09 PROCEDURE — 77030034154 HC SHR COAG HARM ACE J&J -F: Performed by: SURGERY

## 2019-09-09 PROCEDURE — 74011250636 HC RX REV CODE- 250/636: Performed by: ANESTHESIOLOGY

## 2019-09-09 PROCEDURE — 77030031139 HC SUT VCRL2 J&J -A: Performed by: SURGERY

## 2019-09-09 PROCEDURE — 76060000034 HC ANESTHESIA 1.5 TO 2 HR: Performed by: SURGERY

## 2019-09-09 PROCEDURE — 76210000006 HC OR PH I REC 0.5 TO 1 HR: Performed by: SURGERY

## 2019-09-09 PROCEDURE — 76010000161 HC OR TIME 1 TO 1.5 HR INTENSV-TIER 1: Performed by: SURGERY

## 2019-09-09 RX ORDER — OXYCODONE HYDROCHLORIDE 5 MG/1
10 TABLET ORAL
Status: COMPLETED | OUTPATIENT
Start: 2019-09-09 | End: 2019-09-09

## 2019-09-09 RX ORDER — FENTANYL CITRATE 50 UG/ML
INJECTION, SOLUTION INTRAMUSCULAR; INTRAVENOUS AS NEEDED
Status: DISCONTINUED | OUTPATIENT
Start: 2019-09-09 | End: 2019-09-09 | Stop reason: HOSPADM

## 2019-09-09 RX ORDER — LIDOCAINE HYDROCHLORIDE 20 MG/ML
INJECTION, SOLUTION EPIDURAL; INFILTRATION; INTRACAUDAL; PERINEURAL AS NEEDED
Status: DISCONTINUED | OUTPATIENT
Start: 2019-09-09 | End: 2019-09-09 | Stop reason: HOSPADM

## 2019-09-09 RX ORDER — MIDAZOLAM HYDROCHLORIDE 1 MG/ML
2 INJECTION, SOLUTION INTRAMUSCULAR; INTRAVENOUS
Status: DISCONTINUED | OUTPATIENT
Start: 2019-09-09 | End: 2019-09-09 | Stop reason: HOSPADM

## 2019-09-09 RX ORDER — DIPHENHYDRAMINE HYDROCHLORIDE 50 MG/ML
12.5 INJECTION, SOLUTION INTRAMUSCULAR; INTRAVENOUS ONCE
Status: DISCONTINUED | OUTPATIENT
Start: 2019-09-09 | End: 2019-09-09 | Stop reason: HOSPADM

## 2019-09-09 RX ORDER — DEXAMETHASONE SODIUM PHOSPHATE 4 MG/ML
INJECTION, SOLUTION INTRA-ARTICULAR; INTRALESIONAL; INTRAMUSCULAR; INTRAVENOUS; SOFT TISSUE AS NEEDED
Status: DISCONTINUED | OUTPATIENT
Start: 2019-09-09 | End: 2019-09-09 | Stop reason: HOSPADM

## 2019-09-09 RX ORDER — FENTANYL CITRATE 50 UG/ML
100 INJECTION, SOLUTION INTRAMUSCULAR; INTRAVENOUS AS NEEDED
Status: DISCONTINUED | OUTPATIENT
Start: 2019-09-09 | End: 2019-09-09 | Stop reason: HOSPADM

## 2019-09-09 RX ORDER — HYDROCODONE BITARTRATE AND ACETAMINOPHEN 5; 325 MG/1; MG/1
TABLET ORAL
Qty: 20 TAB | Refills: 0 | Status: SHIPPED
Start: 2019-09-09 | End: 2019-09-16

## 2019-09-09 RX ORDER — SODIUM CHLORIDE, SODIUM LACTATE, POTASSIUM CHLORIDE, CALCIUM CHLORIDE 600; 310; 30; 20 MG/100ML; MG/100ML; MG/100ML; MG/100ML
1000 INJECTION, SOLUTION INTRAVENOUS CONTINUOUS
Status: DISCONTINUED | OUTPATIENT
Start: 2019-09-09 | End: 2019-09-09 | Stop reason: HOSPADM

## 2019-09-09 RX ORDER — HYDROMORPHONE HYDROCHLORIDE 2 MG/ML
0.5 INJECTION, SOLUTION INTRAMUSCULAR; INTRAVENOUS; SUBCUTANEOUS
Status: DISCONTINUED | OUTPATIENT
Start: 2019-09-09 | End: 2019-09-09 | Stop reason: HOSPADM

## 2019-09-09 RX ORDER — OXYCODONE HYDROCHLORIDE 5 MG/1
5 TABLET ORAL
Status: DISCONTINUED | OUTPATIENT
Start: 2019-09-09 | End: 2019-09-09 | Stop reason: HOSPADM

## 2019-09-09 RX ORDER — ACETAMINOPHEN 500 MG
500 TABLET ORAL ONCE
Status: DISCONTINUED | OUTPATIENT
Start: 2019-09-09 | End: 2019-09-09 | Stop reason: HOSPADM

## 2019-09-09 RX ORDER — KETOROLAC TROMETHAMINE 30 MG/ML
INJECTION, SOLUTION INTRAMUSCULAR; INTRAVENOUS AS NEEDED
Status: DISCONTINUED | OUTPATIENT
Start: 2019-09-09 | End: 2019-09-09 | Stop reason: HOSPADM

## 2019-09-09 RX ORDER — ONDANSETRON 2 MG/ML
INJECTION INTRAMUSCULAR; INTRAVENOUS AS NEEDED
Status: DISCONTINUED | OUTPATIENT
Start: 2019-09-09 | End: 2019-09-09 | Stop reason: HOSPADM

## 2019-09-09 RX ORDER — LIDOCAINE HYDROCHLORIDE 10 MG/ML
0.1 INJECTION INFILTRATION; PERINEURAL AS NEEDED
Status: DISCONTINUED | OUTPATIENT
Start: 2019-09-09 | End: 2019-09-09 | Stop reason: HOSPADM

## 2019-09-09 RX ORDER — ONDANSETRON HYDROCHLORIDE 8 MG/1
8 TABLET, FILM COATED ORAL
Qty: 10 TAB | Refills: 0 | Status: SHIPPED
Start: 2019-09-09 | End: 2020-01-31

## 2019-09-09 RX ORDER — NALOXONE HYDROCHLORIDE 0.4 MG/ML
0.1 INJECTION, SOLUTION INTRAMUSCULAR; INTRAVENOUS; SUBCUTANEOUS AS NEEDED
Status: DISCONTINUED | OUTPATIENT
Start: 2019-09-09 | End: 2019-09-09 | Stop reason: HOSPADM

## 2019-09-09 RX ORDER — ROCURONIUM BROMIDE 10 MG/ML
INJECTION, SOLUTION INTRAVENOUS AS NEEDED
Status: DISCONTINUED | OUTPATIENT
Start: 2019-09-09 | End: 2019-09-09 | Stop reason: HOSPADM

## 2019-09-09 RX ORDER — ONDANSETRON 2 MG/ML
4 INJECTION INTRAMUSCULAR; INTRAVENOUS ONCE
Status: DISCONTINUED | OUTPATIENT
Start: 2019-09-09 | End: 2019-09-09 | Stop reason: HOSPADM

## 2019-09-09 RX ORDER — PROPOFOL 10 MG/ML
INJECTION, EMULSION INTRAVENOUS AS NEEDED
Status: DISCONTINUED | OUTPATIENT
Start: 2019-09-09 | End: 2019-09-09 | Stop reason: HOSPADM

## 2019-09-09 RX ORDER — SODIUM CHLORIDE, SODIUM LACTATE, POTASSIUM CHLORIDE, CALCIUM CHLORIDE 600; 310; 30; 20 MG/100ML; MG/100ML; MG/100ML; MG/100ML
75 INJECTION, SOLUTION INTRAVENOUS CONTINUOUS
Status: DISCONTINUED | OUTPATIENT
Start: 2019-09-09 | End: 2019-09-09 | Stop reason: HOSPADM

## 2019-09-09 RX ORDER — MORPHINE SULFATE 15 MG/1
15 TABLET ORAL
COMMUNITY
End: 2020-01-31

## 2019-09-09 RX ORDER — BUPIVACAINE HYDROCHLORIDE AND EPINEPHRINE 2.5; 5 MG/ML; UG/ML
INJECTION, SOLUTION EPIDURAL; INFILTRATION; INTRACAUDAL; PERINEURAL AS NEEDED
Status: DISCONTINUED | OUTPATIENT
Start: 2019-09-09 | End: 2019-09-09 | Stop reason: HOSPADM

## 2019-09-09 RX ADMIN — SODIUM CHLORIDE, SODIUM LACTATE, POTASSIUM CHLORIDE, AND CALCIUM CHLORIDE 1000 ML: 600; 310; 30; 20 INJECTION, SOLUTION INTRAVENOUS at 08:19

## 2019-09-09 RX ADMIN — LIDOCAINE HYDROCHLORIDE 100 MG: 20 INJECTION, SOLUTION EPIDURAL; INFILTRATION; INTRACAUDAL; PERINEURAL at 09:51

## 2019-09-09 RX ADMIN — ROCURONIUM BROMIDE 35 MG: 10 INJECTION, SOLUTION INTRAVENOUS at 09:51

## 2019-09-09 RX ADMIN — HYDROMORPHONE HYDROCHLORIDE 0.5 MG: 2 INJECTION INTRAMUSCULAR; INTRAVENOUS; SUBCUTANEOUS at 11:22

## 2019-09-09 RX ADMIN — DEXAMETHASONE SODIUM PHOSPHATE 10 MG: 4 INJECTION, SOLUTION INTRA-ARTICULAR; INTRALESIONAL; INTRAMUSCULAR; INTRAVENOUS; SOFT TISSUE at 09:58

## 2019-09-09 RX ADMIN — HYDROMORPHONE HYDROCHLORIDE 0.5 MG: 2 INJECTION INTRAMUSCULAR; INTRAVENOUS; SUBCUTANEOUS at 11:27

## 2019-09-09 RX ADMIN — OXYCODONE HYDROCHLORIDE 10 MG: 5 TABLET ORAL at 11:25

## 2019-09-09 RX ADMIN — PROPOFOL 200 MG: 10 INJECTION, EMULSION INTRAVENOUS at 09:51

## 2019-09-09 RX ADMIN — FENTANYL CITRATE 50 MCG: 50 INJECTION, SOLUTION INTRAMUSCULAR; INTRAVENOUS at 10:40

## 2019-09-09 RX ADMIN — KETOROLAC TROMETHAMINE 30 MG: 30 INJECTION, SOLUTION INTRAMUSCULAR; INTRAVENOUS at 11:10

## 2019-09-09 RX ADMIN — FENTANYL CITRATE 50 MCG: 50 INJECTION, SOLUTION INTRAMUSCULAR; INTRAVENOUS at 11:10

## 2019-09-09 RX ADMIN — ONDANSETRON 4 MG: 2 INJECTION INTRAMUSCULAR; INTRAVENOUS at 09:58

## 2019-09-09 NOTE — DISCHARGE INSTRUCTIONS
Ena Simms M.D.  [phone]    Instructions following Laparoscopic Cholecystectomy:    ACTIVITY:   Try to take a few short walks with help around the house later today. It is very important to take short walks to avoid blood clots and pneumonia.  You may be light-headed or sleepy from anesthesia, so be careful going up and down stairs.  Avoid any activity that may be dangerous or involves heavy objects for 24-48 hours. DIET:   Drink only clear, non-carbonated liquids when you get home (sugar-free if you are diabetic), such as Gatorade, chicken broth, etc.   Tomorrow start soft foods such as grits and eggs, mashed potatoes, yogurt, cottage cheese, etc. Remain on soft foods for at least 24-36 hours then you may eat whatever foods you wish.  Many people experience nausea for a day or so after surgery, and many people have loose stools or diarrhea immediately after gallbladder surgery. It is also not uncommon to not have a bowel movement for 2-3 days. PAIN:   You will be given a prescription for pain medication and nausea.  Try to take the pain medication with food, even a few crackers.  You may also use Tylenol, Motrin, Advil, or Aleve instead of the prescription pain medication. Do no take Tylenol and the prescription pain medication within 4 hours of each other.  URINARY RETENTION: If you are unable to empty your bladder within 6 hours after returning home, please go to your nearest Emergency Department or Urgent Care for urinary catheterization. WOUND CARE:   You may shower the day after surgery   It is not uncommon for the incisions to ooze or drain blood-tinged fluid. Cover the area with gauze if the drainage continues.  Incisions will sometimes develop redness around them, up to the size of a quarter, as well as a hard lumpy feel. If this redness continues to get larger, please call the office.     PLEASE NOTE: it is not uncommon to have pain and a \"knot\" to the left of the belly button. CALL THE DOCTOR IF:   You have a temperature higher than 101.5° Fahrenheit for more than 6 hours.  You have severe nausea or vomiting not relieved by medication; or diarrhea.  You develop increasing redness or infection at the incision. Continue home medications as previously prescribed. please remember to pump and discard breast milk for the first 24hrs after surgery      After general anesthesia or intravenous sedation, for 24 hours or while taking prescription Narcotics:  · Limit your activities  · A responsible adult needs to be with you for the next 24 hours  · Do not drive and operate hazardous machinery  · Do not make important personal or business decisions  · Do  not drink alcoholic beverages  · If you have not urinated within 8 hours after discharge, please contact your surgeon on call. · If you have sleep apnea and have a CPAP machine, please use it for all naps and sleeping. · Please use caution when taking narcotics and any of your home medications that may cause drowsiness. *  Please give a list of your current medications to your Primary Care Provider. *  Please update this list whenever your medications are discontinued, doses are      changed, or new medications (including over-the-counter products) are added. *  Please carry medication information at all times in case of emergency situations. These are general instructions for a healthy lifestyle:  No smoking/ No tobacco products/ Avoid exposure to second hand smoke  Surgeon General's Warning:  Quitting smoking now greatly reduces serious risk to your health.   Obesity, smoking, and sedentary lifestyle greatly increases your risk for illness  A healthy diet, regular physical exercise & weight monitoring are important for maintaining a healthy lifestyle    You may be retaining fluid if you have a history of heart failure or if you experience any of the following symptoms:  Weight gain of 3 pounds or more overnight or 5 pounds in a week, increased swelling in our hands or feet or shortness of breath while lying flat in bed. Please call your doctor as soon as you notice any of these symptoms; do not wait until your next office visit.

## 2019-09-09 NOTE — INTERVAL H&P NOTE
H&P Update:  Alex Yu was seen and examined. History and physical has been reviewed. The patient has been examined.  There have been no significant clinical changes since the completion of the originally dated History and Physical.

## 2019-09-09 NOTE — ANESTHESIA POSTPROCEDURE EVALUATION
Procedure(s):  CHOLECYSTECTOMY LAPAROSCOPIC SINGLE INCISION (SILS). total IV anesthesia    Anesthesia Post Evaluation      Multimodal analgesia: multimodal analgesia used between 6 hours prior to anesthesia start to PACU discharge  Patient location during evaluation: PACU  Patient participation: complete - patient participated  Level of consciousness: awake and awake and alert  Pain management: adequate  Airway patency: patent  Anesthetic complications: no  Cardiovascular status: acceptable  Respiratory status: acceptable  Hydration status: acceptable  Post anesthesia nausea and vomiting:  controlled      Vitals Value Taken Time   /69 9/9/2019 11:47 AM   Temp 36.5 °C (97.7 °F) 9/9/2019 11:14 AM   Pulse 69 9/9/2019 11:47 AM   Resp 19 9/9/2019 11:47 AM   SpO2 98 % 9/9/2019 11:47 AM   Vitals shown include unvalidated device data.

## 2019-09-09 NOTE — OP NOTES
Operative Report    Patient: Darya Choe MRN: [id number]      YOB: 1980  Age: 46 y.o. Sex: female       Date of Surgery: 9/9/2019     Preoperative Diagnosis: Gallstones K80.20     Postoperative Diagnosis: cholelithiasis with acute cholecystitis and Hydrops Gallbladder      Procedure:  Laparoscopic Cholecystectomy -Single Incision/SILS    Anesthesia: General   Complications: none  Estimated Blood Loss: per anesthesia    Indications:  As outlined in the History and Physical.  Single incision technique is planned to provide the patient with the benefit of less incisional pain and improved cosmesis due to fewer incisions as well as the potential for lower risk of wound infection. This technique is significantly more intricate than standard laparoscopic techniques and typically increases the complexity of the procedure by 10-20%. Procedure in Detail:   Informed consent was obtained and the patient was brought to the operating room and placed on the table in supine position with adequate padding of all pressure points and compression devices on both lower extremities. After the successful induction of general anesthesia, the patients abdomen was prepped and draped sterilely. Under direct laparoscopic visualization and additional local anesthetic, a 5mm optiview-type Trocar was inserted through a curved infraumbilical  incision and pneumoperitoneum was created. Visual exploration revealed no immediately apparent pathology other than an obviously distended and indurated gallbladder. An additional 5mm Trocar was placed through the incision  and a [place] grasper was inserted alongside that trocar sleeve. The gallbladder was aspirated of 90cc of nearly clear and nearly colorless, mucoid fluid. It was grasped by its neck and retracted anterolaterally.  The tissues investing the neck area were incised and blunt dissection was used to attempt to identify and skeletonize the normal caliber cystic duct.  Severe acute and chronic inflammatory changes rendered this difficult. The artery was able to be confidently identified in the typical anatomic position under the node and it was divided at the gallbladder wall with harmonic amber. Due to the inflammatory changes, conversion was made to the retrograde/\"dome down\" technique. The gallbladder was amputated from the liver bed in a lateral to medial direction with harmonic amber. This confirmed severe chronic inflammatory changes with some difficulty in identifying and maintaining a tissue plane. Eventually the entire gallbladder was freed, leaving just the ductal pedicle allowing a near 360 degree view of the region to confirm normal anatomy of the origin of the cystic duct. The duct appeared to be short thus 2 clips were placed on the duct near the junction with the gallbladder and it was transected. The right upper quadrant was irrigated to confirm hemostasis of the liver bed. the gallbladder was placed in a pouch which was extracted through the umbilical site. This required significant enlargement of the fascial defect. This defect was closed, after dissecting out and incorporating a small umbilical hernia, with #1 PDS sutures in an interrupted, figure of eight fashion. The incision was  made hemostatic with cautery and closed with subcuticular 4-0 Vicryl and Steri-Strips were applied. The patient tolerated the procedure well. There were no immediate apparent complications. She was awakened from anesthesia and extubated in the operating room, taken to recovery in satisfactory condition. Specimens:   ID Type Source Tests Collected by Time Destination   1 : gallbladder Preservative Gallbladder  Elzbieta Lopez MD 9/9/2019 1047 Pathology           Counts: Sponge and needle counts were correct.     Signed By:  Margarette Kenney MD     September 9, 2019

## 2019-09-09 NOTE — ANESTHESIA PREPROCEDURE EVALUATION
Anesthetic History               Review of Systems / Medical History  Nursing notes reviewed and pertinent labs reviewed    Pulmonary                   Neuro/Psych         Headaches     Cardiovascular                  Exercise tolerance: >4 METS     GI/Hepatic/Renal     GERD: well controlled          Comments: 6 wks S/P dislodgement of food in esophagus Endo/Other      Hypothyroidism: well controlled       Other Findings              Physical Exam    Airway  Mallampati: I  TM Distance: 4 - 6 cm  Neck ROM: normal range of motion   Mouth opening: Normal     Cardiovascular  Regular rate and rhythm,  S1 and S2 normal,  no murmur, click, rub, or gallop             Dental  No notable dental hx       Pulmonary  Breath sounds clear to auscultation               Abdominal  GI exam deferred       Other Findings            Anesthetic Plan    ASA: 2  Anesthesia type: total IV anesthesia            Anesthetic plan and risks discussed with: Patient

## 2021-03-04 ENCOUNTER — HOSPITAL ENCOUNTER (OUTPATIENT)
Dept: ULTRASOUND IMAGING | Age: 41
Discharge: HOME | End: 2021-03-04
Attending: PHYSICIAN ASSISTANT
Payer: COMMERCIAL

## 2021-03-04 DIAGNOSIS — E04.9 THYROID ENLARGEMENT: ICD-10-CM

## 2021-03-04 DIAGNOSIS — R13.13 PHARYNGEAL DYSPHAGIA: ICD-10-CM

## 2021-03-04 PROCEDURE — 76536 US EXAM OF HEAD AND NECK: CPT

## 2021-03-16 ENCOUNTER — HOSPITAL ENCOUNTER (OUTPATIENT)
Dept: MAMMOGRAPHY | Age: 41
Discharge: HOME | End: 2021-03-16
Attending: PHYSICIAN ASSISTANT
Payer: COMMERCIAL

## 2021-03-16 DIAGNOSIS — Z12.31 ENCOUNTER FOR SCREENING MAMMOGRAM FOR MALIGNANT NEOPLASM OF BREAST: ICD-10-CM

## 2021-03-16 PROCEDURE — 77067 SCR MAMMO BI INCL CAD: CPT

## 2022-03-19 PROBLEM — Z23 ENCOUNTER FOR IMMUNIZATION: Status: ACTIVE | Noted: 2019-01-17

## 2022-03-30 ENCOUNTER — TRANSCRIBE ORDER (OUTPATIENT)
Dept: SCHEDULING | Age: 42
End: 2022-03-30

## 2022-03-30 DIAGNOSIS — Z12.31 VISIT FOR SCREENING MAMMOGRAM: Primary | ICD-10-CM

## 2022-04-21 ENCOUNTER — HOSPITAL ENCOUNTER (OUTPATIENT)
Dept: MAMMOGRAPHY | Age: 42
Discharge: HOME | End: 2022-04-21
Attending: PHYSICIAN ASSISTANT
Payer: COMMERCIAL

## 2022-04-21 DIAGNOSIS — Z12.31 VISIT FOR SCREENING MAMMOGRAM: ICD-10-CM

## 2022-04-21 PROCEDURE — 77067 SCR MAMMO BI INCL CAD: CPT

## 2023-01-09 NOTE — PROGRESS NOTES
HPI:  Ms. Shobha Chan is a 40 y.o.   OB History          5    Para   5    Term   4       1    AB   0    Living   5         SAB        IAB        Ectopic        Molar        Multiple        Live Births   5             who is here today for a well woman exam. She complains of cycle irregularity and heavy bleeding during cycle.  had vasectomy  Feels like her thyroid is \"off\"- significant hair loss- will check TSH was on synthroid before- after pregnancies  Heaviest days changing every 1.5-2 hours- for one 2 days only one time has this happened         Date Performed Result   PAP 2019 Negative, HPV Negative   Mammogram 2022 Negative, BDS 1   Colonoscopy NA    Dexa NA            GYN History           2022 Cycle Length irregular. Lasting 5  positive dysmenorrhea; negative postcoital bleeding    Past Medical History:  Past Medical History:   Diagnosis Date    Abnormal Pap smear     x2, then normal after retested    Abnormal Papanicolaou smear of cervix     Anemia     denies hx of blood transfusions    Anxiety     Breast feeding status of mother 2019    currently breast feeding infant     GERD (gastroesophageal reflux disease)     omeprazole as needed- per patient has resolved     Hypothyroidism     Intractable migraine with aura without status migrainosus 10/1/2015    Positive H. pylori test 10/1/2015    Premature cervical dilation in third trimester 2019     delivery      labor 2019    Rh negative state in antepartum period     Rhesus isoimmunization affecting pregnancy     Thyroid activity decreased     levothyroxine       Past Surgical History:  Past Surgical History:   Procedure Laterality Date    APPENDECTOMY      CHOLECYSTECTOMY      WISDOM TOOTH EXTRACTION         Allergies:   No Known Allergies    Medication History:  No current outpatient medications on file. No current facility-administered medications for this visit.        Social History:  Social History     Socioeconomic History    Marital status:      Spouse name: Not on file    Number of children: Not on file    Years of education: Not on file    Highest education level: Not on file   Occupational History    Not on file   Tobacco Use    Smoking status: Never    Smokeless tobacco: Never   Vaping Use    Vaping Use: Never used   Substance and Sexual Activity    Alcohol use: No    Drug use: No    Sexual activity: Yes     Partners: Male     Birth control/protection: Surgical     Comment: Vasectomy   Other Topics Concern    Not on file   Social History Narrative    Not on file     Social Determinants of Health     Financial Resource Strain: Not on file   Food Insecurity: Not on file   Transportation Needs: Not on file   Physical Activity: Not on file   Stress: Not on file   Social Connections: Not on file   Intimate Partner Violence: Not on file   Housing Stability: Not on file       Family History:  Family History   Problem Relation Age of Onset    Hypertension Father     Cancer Mother         melanoma    Breast Cancer Paternal Aunt 52    Emphysema Paternal Grandfather         Non- Smoker    No Known Problems Maternal Grandfather     Ovarian Cancer Maternal Grandmother         Later in life    Elevated Lipids Father        Review of Systems - General ROS: negative except for that discussed in HPI      ROS:  Feeling well. No dyspnea or chest pain on exertion. No abdominal pain, change in bowel habits, black or bloody stools. No urinary tract symptoms. No neurological complaints.     Objective:   /60   Ht 5' 8\" (1.727 m)   Wt 193 lb (87.5 kg)   LMP 12/21/2022 (Exact Date)   BMI 29.35 kg/m²     Results for orders placed or performed in visit on 01/10/23   AMB POC URINALYSIS DIP STICK MANUAL W/O MICRO   Result Value Ref Range    Color (UA POC) Yellow     Clarity (UA POC) Clear     Glucose, Urine, POC Negative Negative    Bilirubin, Urine, POC Negative Negative    Ketones, Urine, POC Negative Negative    Specific Gravity, Urine, POC 1.020 1.001 - 1.035    Blood (UA POC) Trace Negative    pH, Urine, POC 5.5 4.6 - 8.0    Protein, Urine, POC Negative Negative    Urobilinogen, POC 0.2 mg/dL     Nitrite, Urine, POC Negative Negative    Leukocyte Esterase, Urine, POC Negative Negative        The patient appears well, alert, oriented x 3, in no distress. ENT normal.  Neck supple. No adenopathy or thyromegaly. Lungs:  clear, good air entry, no wheezes, rhonchi or rales. Heart:  S1 and S2 normal, no murmurs, regular rate and rhythm. Abdomen:  soft without tenderness, guarding, mass or organomegaly. Extremities show no edema, normal peripheral pulses. Neurological is normal, no focal findings. BREAST EXAM: breasts appear normal, no suspicious masses, no skin or nipple changes or axillary nodes    PELVIC EXAM: External genitalia is within normal limits, urethra, urethra meatus and bladder are midline well supported. Vagina is well rugated, Cervix comes into full view and is within normal limits. Uterus is 8, ante week size, no ovarian masses palpated    Assessment/Plan:      Diagnosis Orders   1. Well woman exam  AMB POC URINALYSIS DIP STICK MANUAL W/O MICRO    PAP IG, HPV Rfx HPV 16/18,45    [facility] LEE DIGITAL SCREEN BILATERAL      2. Screening for genitourinary condition  AMB POC URINALYSIS DIP STICK MANUAL W/O MICRO      3. Screening for HPV (human papillomavirus)  PAP IG, HPV Rfx HPV 16/18,45      4. Screening for cervical cancer  PAP IG, HPV Rfx HPV 16/18,45      5. Screening mammogram, encounter for  [facility] [address]  BILATERAL        Encounter Diagnoses   Name Primary?     Well woman exam Yes    Screening for genitourinary condition     Screening for HPV (human papillomavirus)     Screening for cervical cancer     Screening mammogram, encounter for      Orders Placed This Encounter   Procedures    [facility] LEE DIGITAL SCREEN BILATERAL     Standing Status:   Future     Standing Expiration Date:   3/9/2024    PAP IG, HPV Rfx HPV 16/18,45     Standing Status:   Future     Standing Expiration Date:   1/9/2024     Order Specific Question:   Pap Source? (Required)     Answer:   cervical     Order Specific Question:   Pap Source? (Required)     Answer:   endocervical     Order Specific Question:   Pap collection method? (Required     Answer:   broom     Order Specific Question:   Pap collection method? (Required     Answer:   brush     Order Specific Question:   Date of LMP? (Required)     Answer:   12/21/2022     Order Specific Question:   Previous Treatment?           (Required)     Answer:   NONE    AMB POC URINALYSIS DIP STICK MANUAL W/O MICRO       mammogram  pap smear  return annually or prn  Check TSH Free T4 history of hypt and hair loss    Juan Pablo Wei RN

## 2023-01-10 ENCOUNTER — OFFICE VISIT (OUTPATIENT)
Dept: OBGYN CLINIC | Age: 43
End: 2023-01-10
Payer: COMMERCIAL

## 2023-01-10 VITALS
HEIGHT: 68 IN | DIASTOLIC BLOOD PRESSURE: 60 MMHG | WEIGHT: 193 LBS | SYSTOLIC BLOOD PRESSURE: 118 MMHG | BODY MASS INDEX: 29.25 KG/M2

## 2023-01-10 DIAGNOSIS — Z11.51 SCREENING FOR HPV (HUMAN PAPILLOMAVIRUS): ICD-10-CM

## 2023-01-10 DIAGNOSIS — Z12.31 SCREENING MAMMOGRAM, ENCOUNTER FOR: ICD-10-CM

## 2023-01-10 DIAGNOSIS — Z12.4 SCREENING FOR CERVICAL CANCER: ICD-10-CM

## 2023-01-10 DIAGNOSIS — E03.9 HYPOTHYROIDISM, UNSPECIFIED TYPE: ICD-10-CM

## 2023-01-10 DIAGNOSIS — Z13.89 SCREENING FOR GENITOURINARY CONDITION: ICD-10-CM

## 2023-01-10 DIAGNOSIS — Z01.419 WELL WOMAN EXAM: Primary | ICD-10-CM

## 2023-01-10 LAB
BILIRUBIN, URINE, POC: NEGATIVE
BLOOD URINE, POC: NORMAL
GLUCOSE URINE, POC: NEGATIVE
KETONES, URINE, POC: NEGATIVE
LEUKOCYTE ESTERASE, URINE, POC: NEGATIVE
NITRITE, URINE, POC: NEGATIVE
PH, URINE, POC: 5.5 (ref 4.6–8)
PROTEIN,URINE, POC: NEGATIVE
SPECIFIC GRAVITY, URINE, POC: 1.02 (ref 1–1.03)
URINALYSIS CLARITY, POC: CLEAR
URINALYSIS COLOR, POC: YELLOW
UROBILINOGEN, POC: NORMAL

## 2023-01-10 PROCEDURE — 99386 PREV VISIT NEW AGE 40-64: CPT | Performed by: OBSTETRICS & GYNECOLOGY

## 2023-01-10 PROCEDURE — 81002 URINALYSIS NONAUTO W/O SCOPE: CPT | Performed by: OBSTETRICS & GYNECOLOGY

## 2023-01-11 LAB
T4 FREE SERPL-MCNC: 1 NG/DL (ref 0.78–1.46)
TSH, 3RD GENERATION: 2.17 UIU/ML (ref 0.36–3.74)

## 2023-01-15 LAB
CYTOLOGIST CVX/VAG CYTO: NORMAL
CYTOLOGY CVX/VAG DOC THIN PREP: NORMAL
HPV APTIMA: NEGATIVE
Lab: NORMAL
PATH REPORT.FINAL DX SPEC: NORMAL
STAT OF ADQ CVX/VAG CYTO-IMP: NORMAL

## 2023-02-02 NOTE — PROGRESS NOTES
[facility] CALLED FOR BLS [facility]ONG BACK TO [facility]

PER [facility] ETA 75 MIN Results for Lanie Siu (MRN [id number]) as of 5/21/2019 10:40   Ref. Range 5/21/2019 10:15   Rupture of fetal membrane Latest Ref Range: Negative  Negative   Control line present?  Unknown Acceptable

## 2023-05-11 ENCOUNTER — HOSPITAL ENCOUNTER (OUTPATIENT)
Dept: MAMMOGRAPHY | Age: 43
Discharge: HOME | End: 2023-05-11
Payer: COMMERCIAL

## 2023-05-11 DIAGNOSIS — Z12.31 SCREENING MAMMOGRAM, ENCOUNTER FOR: ICD-10-CM

## 2023-05-11 DIAGNOSIS — Z01.419 WELL WOMAN EXAM: ICD-10-CM

## 2023-05-11 PROCEDURE — 77063 BREAST TOMOSYNTHESIS BI: CPT

## 2024-01-01 NOTE — ED NOTES
I have reviewed discharge instructions with the patient. The patient verbalized understanding. Patient left ED via Discharge Method: ambulatory to Home with family/ friend at bedside. Opportunity for questions and clarification provided. Patient given 2 scripts. To continue your aftercare when you leave the hospital, you may receive an automated call from our care team to check in on how you are doing. This is a free service and part of our promise to provide the best care and service to meet your aftercare needs.  If you have questions, or wish to unsubscribe from this service please call [phone]. Thank you for Choosing our [facility].
Pt states the pain is better.
Reported off to [employer]
[place]

## 2024-05-24 ENCOUNTER — HOSPITAL ENCOUNTER (OUTPATIENT)
Dept: MAMMOGRAPHY | Age: 44
Discharge: HOME | End: 2024-05-24
Payer: COMMERCIAL

## 2024-05-24 VITALS — BODY MASS INDEX: 31.07 KG/M2 | WEIGHT: 205 LBS | HEIGHT: 68 IN

## 2024-05-24 DIAGNOSIS — Z12.31 SCREENING MAMMOGRAM FOR BREAST CANCER: ICD-10-CM

## 2024-05-24 PROCEDURE — 77063 BREAST TOMOSYNTHESIS BI: CPT

## 2024-12-17 NOTE — PROGRESS NOTES
HPI:  Ms. Kitchen is a 44 y.o.   OB History          6    Para   6    Term   5       1    AB   0    Living   5         SAB        IAB        Ectopic        Molar        Multiple        Live Births   5             who is here today for a well woman exam. Cycles over the past year have become more inconsistent and heavier. Ranging from 23-29 days. Previously normally 28d. Lasting 5d.  Changing tampon every 2-3 hours for the first few days.   Increasing bleeding with clots  Occasional has to change clothes  Would like to consider ocps for pills     Date Performed Result   PAP 1/10/23 Negative HR HPV Negative   Mammogram 24 BD1-Benign  Amanda Lifetime Breast Cancer Risk- 12.87%   Colonoscopy NA    Dexa NA      GYN History         Patient's last menstrual period was 2024 (exact date).   Past Medical History:  Past Medical History:   Diagnosis Date    Abnormal Pap smear     x2, then normal after retested    Abnormal Papanicolaou smear of cervix     Anemia     denies hx of blood transfusions    Anxiety     Breast feeding status of mother 2019    currently breast feeding infant     GERD (gastroesophageal reflux disease)     omeprazole as needed- per patient has resolved     Hypothyroidism     Intractable migraine with aura without status migrainosus 10/1/2015    Positive H. pylori test 10/1/2015    Premature cervical dilation in third trimester 2019     delivery      labor 2019    Rh negative state in antepartum period     Rhesus isoimmunization affecting pregnancy     Thyroid activity decreased     levothyroxine       Past Surgical History:  Past Surgical History:   Procedure Laterality Date    APPENDECTOMY      CHOLECYSTECTOMY      WISDOM TOOTH EXTRACTION         Allergies:   No Known Allergies    Medication History:  No current outpatient medications on file.     No current facility-administered medications for this visit.       Social

## 2024-12-18 ENCOUNTER — OFFICE VISIT (OUTPATIENT)
Dept: OBGYN CLINIC | Age: 44
End: 2024-12-18
Payer: COMMERCIAL

## 2024-12-18 VITALS
BODY MASS INDEX: 32.18 KG/M2 | HEIGHT: 68 IN | DIASTOLIC BLOOD PRESSURE: 76 MMHG | SYSTOLIC BLOOD PRESSURE: 116 MMHG | WEIGHT: 212.3 LBS

## 2024-12-18 DIAGNOSIS — Z12.31 VISIT FOR SCREENING MAMMOGRAM: ICD-10-CM

## 2024-12-18 DIAGNOSIS — Z13.89 SCREENING FOR GENITOURINARY CONDITION: ICD-10-CM

## 2024-12-18 DIAGNOSIS — Z11.51 SCREENING FOR HUMAN PAPILLOMAVIRUS (HPV): ICD-10-CM

## 2024-12-18 DIAGNOSIS — Z12.4 PAP SMEAR FOR CERVICAL CANCER SCREENING: ICD-10-CM

## 2024-12-18 DIAGNOSIS — Z01.419 WELL WOMAN EXAM: Primary | ICD-10-CM

## 2024-12-18 LAB
BILIRUBIN, URINE, POC: NORMAL
BLOOD URINE, POC: NORMAL
GLUCOSE URINE, POC: NEGATIVE
KETONES, URINE, POC: NEGATIVE
LEUKOCYTE ESTERASE, URINE, POC: NEGATIVE
NITRITE, URINE, POC: NEGATIVE
PH, URINE, POC: 6 (ref 4.6–8)
PROTEIN,URINE, POC: NORMAL
SPECIFIC GRAVITY, URINE, POC: 1.02 (ref 1–1.03)
URINALYSIS CLARITY, POC: CLEAR
URINALYSIS COLOR, POC: YELLOW
UROBILINOGEN, POC: NORMAL MG/DL

## 2024-12-18 PROCEDURE — G8484 FLU IMMUNIZE NO ADMIN: HCPCS | Performed by: OBSTETRICS & GYNECOLOGY

## 2024-12-18 PROCEDURE — 81002 URINALYSIS NONAUTO W/O SCOPE: CPT | Performed by: OBSTETRICS & GYNECOLOGY

## 2024-12-18 PROCEDURE — 99396 PREV VISIT EST AGE 40-64: CPT | Performed by: OBSTETRICS & GYNECOLOGY

## 2024-12-18 RX ORDER — NORETHINDRONE ACETATE AND ETHINYL ESTRADIOL 1MG-20(21)
1 KIT ORAL DAILY
Qty: 3 PACKET | Refills: 3 | Status: SHIPPED | OUTPATIENT
Start: 2024-12-18

## 2024-12-18 SDOH — ECONOMIC STABILITY: FOOD INSECURITY: WITHIN THE PAST 12 MONTHS, YOU WORRIED THAT YOUR FOOD WOULD RUN OUT BEFORE YOU GOT MONEY TO BUY MORE.: NEVER TRUE

## 2024-12-18 SDOH — ECONOMIC STABILITY: INCOME INSECURITY: HOW HARD IS IT FOR YOU TO PAY FOR THE VERY BASICS LIKE FOOD, HOUSING, MEDICAL CARE, AND HEATING?: NOT HARD AT ALL

## 2024-12-18 SDOH — ECONOMIC STABILITY: FOOD INSECURITY: WITHIN THE PAST 12 MONTHS, THE FOOD YOU BOUGHT JUST DIDN'T LAST AND YOU DIDN'T HAVE MONEY TO GET MORE.: NEVER TRUE

## 2024-12-18 SDOH — ECONOMIC STABILITY: TRANSPORTATION INSECURITY
IN THE PAST 12 MONTHS, HAS LACK OF TRANSPORTATION KEPT YOU FROM MEETINGS, WORK, OR FROM GETTING THINGS NEEDED FOR DAILY LIVING?: NO

## 2024-12-27 LAB
COLLECTION METHOD: NORMAL
CYTOLOGIST CVX/VAG CYTO: NORMAL
CYTOLOGY CVX/VAG DOC THIN PREP: NORMAL
HPV APTIMA: NEGATIVE
Lab: NORMAL
PAP SOURCE: NORMAL
PATH REPORT.FINAL DX SPEC: NORMAL
STAT OF ADQ CVX/VAG CYTO-IMP: NORMAL

## 2025-02-17 NOTE — PROGRESS NOTES
The patient is a 44 y.o.  who is seen for US secondary to menorrhagia with irregular cycle. Pt seen 24 for WWE and reported cycles range from 23-29 days. Pt was started on Loestrin and reports she had her first cycle on the Loestrin last week and cycle was lighter and shorter.    US findings from today:  GYN U/S SECONDARY TO: Menorrhagia with irreg cycle.   CX: Appears WNL   UTERUS: Anteverted and heterogenous   ENDO= 6.9 mm, no intracavity masses visualized.   ROV: WNL   LOV: WNL, subcentimeter dominant follicle noted.   No adn masses. Small amount of simple free fluid visualized in PCDS.   Uterine volume: 70.303    HISTORY:      Patient's last menstrual period was 2025 (exact date).    Current Outpatient Medications on File Prior to Visit   Medication Sig Dispense Refill    norethindrone-ethinyl estradiol (LOESTRIN FE ) 1-20 MG-MCG per tablet Take 1 tablet by mouth daily 3 packet 3     No current facility-administered medications on file prior to visit.       ROS:  Feeling well. No dyspnea or chest pain on exertion.  No abdominal pain, change in bowel habits, black or bloody stools.  No urinary tract symptoms. GYN ROS: normal menses, no abnormal bleeding, pelvic pain or discharge, no breast pain or new or enlarging lumps on self exam.    PHYSICAL EXAM:  Blood pressure 118/72, height 1.727 m (5' 8\"), weight 95.7 kg (210 lb 14.4 oz), last menstrual period 2025.    The patient appears well, alert, oriented x 3, in no distress.  See US  Normal us with normal lining  Reassurance  Bleeding improved pain improved on ocps wants to continue    ASSESSMENT:    1. Menorrhagia with regular cycle       PLAN:  Continue ocps  Recheck for annual sooner if problems

## 2025-02-19 ENCOUNTER — PROCEDURE VISIT (OUTPATIENT)
Dept: OBGYN CLINIC | Age: 45
End: 2025-02-19
Payer: COMMERCIAL

## 2025-02-19 ENCOUNTER — OFFICE VISIT (OUTPATIENT)
Dept: OBGYN CLINIC | Age: 45
End: 2025-02-19
Payer: COMMERCIAL

## 2025-02-19 VITALS
WEIGHT: 210.9 LBS | DIASTOLIC BLOOD PRESSURE: 72 MMHG | BODY MASS INDEX: 31.96 KG/M2 | HEIGHT: 68 IN | SYSTOLIC BLOOD PRESSURE: 118 MMHG

## 2025-02-19 DIAGNOSIS — N92.0 MENORRHAGIA WITH REGULAR CYCLE: Primary | ICD-10-CM

## 2025-02-19 DIAGNOSIS — N92.1 MENORRHAGIA WITH IRREGULAR CYCLE: Primary | ICD-10-CM

## 2025-02-19 PROCEDURE — 1036F TOBACCO NON-USER: CPT | Performed by: OBSTETRICS & GYNECOLOGY

## 2025-02-19 PROCEDURE — G8427 DOCREV CUR MEDS BY ELIG CLIN: HCPCS | Performed by: OBSTETRICS & GYNECOLOGY

## 2025-02-19 PROCEDURE — 76830 TRANSVAGINAL US NON-OB: CPT | Performed by: OBSTETRICS & GYNECOLOGY

## 2025-02-19 PROCEDURE — G8417 CALC BMI ABV UP PARAM F/U: HCPCS | Performed by: OBSTETRICS & GYNECOLOGY

## 2025-02-19 PROCEDURE — 99214 OFFICE O/P EST MOD 30 MIN: CPT | Performed by: OBSTETRICS & GYNECOLOGY

## 2025-02-25 ENCOUNTER — APPOINTMENT (OUTPATIENT)
Dept: GENERAL RADIOLOGY | Age: 45
End: 2025-02-25
Payer: COMMERCIAL

## 2025-02-25 ENCOUNTER — HOSPITAL ENCOUNTER (EMERGENCY)
Age: 45
Discharge: HOME | End: 2025-02-26
Attending: EMERGENCY MEDICINE
Payer: COMMERCIAL

## 2025-02-25 DIAGNOSIS — T18.128A FOOD IMPACTION OF ESOPHAGUS, INITIAL ENCOUNTER: Primary | ICD-10-CM
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
## 2025-02-25 LAB
ANION GAP SERPL CALC-SCNC: 10 MMOL/L (ref 7–16)
BASOPHILS # BLD: 0.05 K/UL (ref 0–0.2)
BASOPHILS NFR BLD: 0.6 % (ref 0–2)
BUN SERPL-MCNC: 11 MG/DL (ref 6–23)
CALCIUM SERPL-MCNC: 9 MG/DL (ref 8.8–10.2)
CHLORIDE SERPL-SCNC: 108 MMOL/L (ref 98–107)
CO2 SERPL-SCNC: 23 MMOL/L (ref 20–29)
CREAT SERPL-MCNC: 0.99 MG/DL (ref 0.6–1.1)
DIFFERENTIAL METHOD BLD: NORMAL
EOSINOPHIL # BLD: 0.18 K/UL (ref 0–0.8)
EOSINOPHIL NFR BLD: 2.1 % (ref 0.5–7.8)
ERYTHROCYTE [DISTWIDTH] IN BLOOD BY AUTOMATED COUNT: 12.7 % (ref 11.9–14.6)
GLUCOSE BLD STRIP.AUTO-MCNC: 92 MG/DL (ref 65–100)
GLUCOSE SERPL-MCNC: 91 MG/DL (ref 70–99)
HCG UR QL: NEGATIVE
HCG, URINE, POC: NEGATIVE
HCT VFR BLD AUTO: 40.9 % (ref 35.8–46.3)
HGB BLD-MCNC: 14.2 G/DL (ref 11.7–15.4)
IMM GRANULOCYTES # BLD AUTO: 0.03 K/UL (ref 0–0.5)
IMM GRANULOCYTES NFR BLD AUTO: 0.4 % (ref 0–5)
LYMPHOCYTES # BLD: 2.78 K/UL (ref 0.5–4.6)
LYMPHOCYTES NFR BLD: 32.9 % (ref 13–44)
Lab: NORMAL
MCH RBC QN AUTO: 30.7 PG (ref 26.1–32.9)
MCHC RBC AUTO-ENTMCNC: 34.7 G/DL (ref 31.4–35)
MCV RBC AUTO: 88.3 FL (ref 82–102)
MONOCYTES # BLD: 0.45 K/UL (ref 0.1–1.3)
MONOCYTES NFR BLD: 5.3 % (ref 4–12)
NEGATIVE QC PASS/FAIL: NORMAL
NEUTS SEG # BLD: 4.97 K/UL (ref 1.7–8.2)
NEUTS SEG NFR BLD: 58.7 % (ref 43–78)
NRBC # BLD: 0 K/UL (ref 0–0.2)
PLATELET # BLD AUTO: 233 K/UL (ref 150–450)
PMV BLD AUTO: 9.8 FL (ref 9.4–12.3)
POSITIVE QC PASS/FAIL: NORMAL
POTASSIUM SERPL-SCNC: 3.9 MMOL/L (ref 3.5–5.1)
RBC # BLD AUTO: 4.63 M/UL (ref 4.05–5.2)
SERVICE CMNT-IMP: NORMAL
SODIUM SERPL-SCNC: 141 MMOL/L (ref 136–145)
WBC # BLD AUTO: 8.5 K/UL (ref 4.3–11.1)

## 2025-02-25 PROCEDURE — 99284 EMERGENCY DEPT VISIT MOD MDM: CPT

## 2025-02-25 PROCEDURE — 6360000002 HC RX W HCPCS

## 2025-02-25 PROCEDURE — 71046 X-RAY EXAM CHEST 2 VIEWS: CPT

## 2025-02-25 PROCEDURE — 82962 GLUCOSE BLOOD TEST: CPT

## 2025-02-25 PROCEDURE — 81025 URINE PREGNANCY TEST: CPT

## 2025-02-25 PROCEDURE — 80048 BASIC METABOLIC PNL TOTAL CA: CPT

## 2025-02-25 PROCEDURE — 85025 COMPLETE CBC W/AUTO DIFF WBC: CPT

## 2025-02-25 RX ORDER — IBUPROFEN 600 MG/1
1 TABLET ORAL
Status: COMPLETED | OUTPATIENT
Start: 2025-02-25 | End: 2025-02-25

## 2025-02-25 RX ADMIN — Medication 1 MG: at 22:00

## 2025-02-25 ASSESSMENT — PAIN DESCRIPTION - DESCRIPTORS: DESCRIPTORS: PRESSURE;HEAVINESS

## 2025-02-25 ASSESSMENT — ENCOUNTER SYMPTOMS
DIARRHEA: 0
SHORTNESS OF BREATH: 0
ABDOMINAL PAIN: 0
NAUSEA: 0
COUGH: 0
CHEST TIGHTNESS: 0
VOMITING: 0
SORE THROAT: 0

## 2025-02-25 ASSESSMENT — PAIN SCALES - GENERAL: PAINLEVEL_OUTOF10: 4

## 2025-02-25 ASSESSMENT — PAIN - FUNCTIONAL ASSESSMENT: PAIN_FUNCTIONAL_ASSESSMENT: 0-10

## 2025-02-25 ASSESSMENT — PAIN DESCRIPTION - LOCATION: LOCATION: THROAT

## 2025-02-25 ASSESSMENT — PAIN DESCRIPTION - ORIENTATION: ORIENTATION: MID

## 2025-02-26 VITALS
RESPIRATION RATE: 18 BRPM | TEMPERATURE: 97.9 F | HEART RATE: 99 BPM | OXYGEN SATURATION: 97 % | SYSTOLIC BLOOD PRESSURE: 124 MMHG | DIASTOLIC BLOOD PRESSURE: 84 MMHG | BODY MASS INDEX: 30.31 KG/M2 | WEIGHT: 200 LBS | HEIGHT: 68 IN

## 2025-02-26 NOTE — DISCHARGE INSTRUCTIONS
Please follow-up with gastroenterology as listed on this document.  GI would like for you to maintain a soft/puree diet and follow up closely. Return to the emergency department with any other concerning symptoms.

## 2025-02-26 NOTE — ED PROVIDER NOTES
Emergency Department Provider Note       PCP: Natalie Morales PA-C   Age: 44 y.o.   Sex: female     DISPOSITION Decision To Discharge 02/26/2025 12:01:41 AM    ICD-10-CM    1. Food impaction of esophagus, initial encounter  T18.128A GELY - Cierra Calzada MD, Gastroenterology, [place]    [id number].F3XA           Medical Decision Making     44-year-old female presents with food impaction.  This occurred 6 hours prior to arrival.  She has tried multiple home remedies.  Given 1 mg of glucagon.  She had no resolution with this and was unable to tolerate p.o. initially.  On-call GI was contacted.  While awaiting their return call patient had vomiting episode and cleared the impaction.  She is now feeling much better and is comfortable being discharged with close follow-up to gastroenterology  ED Course as of 02/26/25 0135   Tue Feb 25, 2025   2220 Patient unable to tolerate water after glucagon administration.  Discussed with attending, will reach out to gastroenterology. [CJ]   2351 While awaiting GIs return phone call patient was able to vomit and now able to tolerate p.o.  Will watch until she is able to drink 1 can of ginger ale and discharge with gastroenterology follow-up. [CJ]   Wed Feb 26, 2025   0011 I spoke with Dr. Calzada with gastroenterology.  I updated her that the patient had an episode of vomiting and was able to tolerate p.o. intake.  She recommended having her follow-up outpatient and stay with a puréed diet. [KH]      ED Course User Index  [CJ] Bharati Mariee, APRN - CNP  [KH] Fredo Macias, DO     1 or more acute illnesses that pose a threat to life or bodily function.   Patient was discharged risks and benefits of hospitalization were considered.  Shared medical decision making was utilized in creating the patients health plan today.  I independently ordered and reviewed each unique test.    I reviewed external records: ED visit note from a different ED.        I interpreted the X-rays no

## 2025-02-26 NOTE — ED NOTES
SUBJECTIVE:   Candice Archibald is a 6 year old female who presents to clinic today for the following   health issues:    URINARY TRACT SYMPTOMS      Duration: yesterday     Description  frequency, hesitancy and retention    Intensity:  moderate    Accompanying signs and symptoms:  Fever/chills: no   Flank pain no   Nausea and vomiting: no   Vaginal symptoms: none  Abdominal/Pelvic Pain: no     History  History of frequent UTI's: no   History of kidney stones: no   Sexually Active: no   Possibility of pregnancy: No    Precipitating or alleviating factors: None    Therapies tried and outcome: none   Outcome: 0    Has had similar symptoms in the past and checked for diabetes and this was negative.  Patient had stool today but it was like marlene and small amount.  Mom states that she was on benefiber in the past for constipation and this was helpful and they have not been taking this recently.    Additional history: as documented    Reviewed  and updated as needed this visit by clinical staff  Allergies  Meds  Problems  Med Hx  Surg Hx  Fam Hx         Reviewed and updated as needed this visit by Provider  Allergies  Meds  Problems  Med Hx  Surg Hx  Fam Hx         There is no problem list on file for this patient.    History reviewed. No pertinent surgical history.    Social History     Tobacco Use     Smoking status: Not on file     Smokeless tobacco: Never Used   Substance Use Topics     Alcohol use: Not on file     History reviewed. No pertinent family history.      Current Outpatient Medications   Medication Sig Dispense Refill     acetaminophen (TYLENOL) 160 MG/5ML solution Take 15 mg/kg by mouth every 6 hours as needed for fever or mild pain       ibuprofen (ADVIL/MOTRIN) 100 MG/5ML suspension Take 10 mg/kg by mouth every 6 hours as needed for fever or moderate pain       No Known Allergies    ROS:  CONSTITUTIONAL: NEGATIVE for fever, chills, change in weight  RESP: NEGATIVE for significant cough or  Pt vomited and she believes she may have removed the bolus. She was able to drink some water and feels like it is clear.     Speaks, GAIL Nunes  02/25/25 7587     "SOB  CV: NEGATIVE for chest pain, palpitations or peripheral edema  GI: POSITIVE for constipation history, last BM today and small amount and like marlene  : normal menstrual cycles, frequency, hesitancy and urgency  PSYCHIATRIC: NEGATIVE for changes in mood or affect, no burning with urination  ROS otherwise negative    OBJECTIVE:     /70   Pulse 104   Temp 99  F (37.2  C) (Tympanic)   Resp 15   Ht 1.035 m (3' 4.75\")   Wt 16.5 kg (36 lb 6.4 oz)   SpO2 98%   BMI 15.41 kg/m    Body mass index is 15.41 kg/m .  GENERAL: healthy, alert and no distress  RESP: lungs clear to auscultation - no rales, rhonchi or wheezes  CV: regular rate and rhythm, normal S1 S2, no S3 or S4, no murmur, click or rub, no peripheral edema and peripheral pulses strong  ABDOMEN: soft, nontender, no hepatosplenomegaly, no masses and bowel sounds normal  PSYCH: mentation appears normal, affect normal/bright    Diagnostic Test Results:  Results for orders placed or performed in visit on 05/13/19 (from the past 24 hour(s))   UA reflex to Microscopic and Culture   Result Value Ref Range    Color Urine Yellow     Appearance Urine Clear     Glucose Urine Negative NEG^Negative mg/dL    Bilirubin Urine Negative NEG^Negative    Ketones Urine Negative NEG^Negative mg/dL    Specific Gravity Urine 1.015 1.003 - 1.035    Blood Urine Negative NEG^Negative    pH Urine 7.0 5.0 - 7.0 pH    Protein Albumin Urine Negative NEG^Negative mg/dL    Urobilinogen Urine 0.2 0.2 - 1.0 EU/dL    Nitrite Urine Negative NEG^Negative    Leukocyte Esterase Urine Negative NEG^Negative    Source Midstream Urine        ASSESSMENT/PLAN:     1. Constipation, unspecified constipation type  Most likely cause is constipation.  Recommend fluids, fiber supplement and use of Miralax for the next couple days to help clean stool out of the colon.  Recommend follow-up if any persistent symptoms despite conservative treatment.      2. Dysuria  UA is negative.  Will contact " mother with results per her request.  - UA reflex to Microscopic and Culture    See Patient Instructions    Julieta eDng NP  [facility]

## 2025-02-26 NOTE — ED NOTES
Patient mobility status  with no difficulty.     I have reviewed discharge instructions with the patient and parent.  The patient and parent verbalized understanding.    Patient left ED via Discharge Method: ambulatory to Home with Parent.    Opportunity for questions and clarification provided.     Patient given 0 scripts.            Des Tucker RN  02/26/25 0033

## 2025-02-26 NOTE — ED TRIAGE NOTES
Pt ambulatory to triage for food bolus stuck in throat. Pt reports history of this before due to having esophagitis. Pt states she is able to swallow water but immediately comes back up. Pt waited for it to try to resolve at home but with no improvement. Pt denies any issues breathing.

## 2025-03-27 ENCOUNTER — PREP FOR PROCEDURE (OUTPATIENT)
Dept: GASTROENTEROLOGY | Age: 45
End: 2025-03-27

## 2025-03-27 ENCOUNTER — OFFICE VISIT (OUTPATIENT)
Dept: GASTROENTEROLOGY | Age: 45
End: 2025-03-27
Payer: COMMERCIAL

## 2025-03-27 VITALS
DIASTOLIC BLOOD PRESSURE: 79 MMHG | WEIGHT: 211.4 LBS | HEART RATE: 71 BPM | BODY MASS INDEX: 32.04 KG/M2 | SYSTOLIC BLOOD PRESSURE: 122 MMHG | HEIGHT: 68 IN

## 2025-03-27 DIAGNOSIS — R13.10 DYSPHAGIA, UNSPECIFIED TYPE: Primary | ICD-10-CM

## 2025-03-27 PROCEDURE — G8417 CALC BMI ABV UP PARAM F/U: HCPCS

## 2025-03-27 PROCEDURE — 99204 OFFICE O/P NEW MOD 45 MIN: CPT

## 2025-03-27 PROCEDURE — 1036F TOBACCO NON-USER: CPT

## 2025-03-27 PROCEDURE — G8427 DOCREV CUR MEDS BY ELIG CLIN: HCPCS

## 2025-03-27 NOTE — PROGRESS NOTES
GASTROENTEROLOGY CLINIC VISIT      CC: Dysphagia    HPI:   Faby Kitchen is 45 y.o. y/o female new patient who presents to our office with complaints of dysphagia. She states she has had this problems for years and had her esophagus dilated about 10 years ago. She cannot remember the reason of why she was having difficulty back then. She said the dilation helped immediately and she hasn't had any trouble until the last 6 months where she notices that food will get stuck mid chest and won't go down. She states she use to be able to walk around to get the food down but now she will have to regurgitate it for the impaction to go away. She went to the ED on 2/26 with a food impaction and GI was contacted, however patient ended up vomiting the impaction up and was able to be discharged. She denies any nausea or vomiting in between the dysphagia, she denies abdominal pain, alcohol or tobacco use, or NSAID use. She does have occasional reflux depending on diet.       PMH/PSH:   Anemia, anxiety, GERD, migraines, hypothyroidism    FMH:   Family history of esophageal strictures  Denies FMH autoimmune disease     SocHx:   Denies smoking  Denies alcohol  Denies illicit drug use    PE:  /79, Pulse 71    Physical Exam:   General appearance - alert, well appearing, and in no distress and oriented to person, place, and time  Mental status - alert, oriented to person, place, and time, normal mood, behavior, speech, dress, motor activity, and thought processes  Heart normal rate and regular rhythm  Abdomen -abdomen is soft without significant tenderness, masses, organomegaly or guarding.  Rectal exam-: deferred, not clinically indicated  Neurologic-Grossly normal:    LABS:   Lab Results   Component Value Date    HGB 14.2 02/25/2025    WBC 8.5 02/25/2025     02/25/2025    MCV 88.3 02/25/2025    IRON 63 07/29/2020    FERRITIN 39 07/29/2020    TIBC 312 01/31/2020    CREATININE 0.99 02/25/2025    ALT 21

## 2025-03-28 RX ORDER — SODIUM CHLORIDE 0.9 % (FLUSH) 0.9 %
5-40 SYRINGE (ML) INJECTION PRN
Status: CANCELLED | OUTPATIENT
Start: 2025-03-28

## 2025-03-28 RX ORDER — SODIUM CHLORIDE 0.9 % (FLUSH) 0.9 %
5-40 SYRINGE (ML) INJECTION EVERY 12 HOURS SCHEDULED
Status: CANCELLED | OUTPATIENT
Start: 2025-03-28

## 2025-03-28 RX ORDER — SODIUM CHLORIDE 9 MG/ML
25 INJECTION, SOLUTION INTRAVENOUS PRN
Status: CANCELLED | OUTPATIENT
Start: 2025-03-28

## 2025-04-01 NOTE — PERIOP NOTE
Patient verified name, , and procedure.    Type: 1a; abbreviated assessment per anesthesia guidelines  Labs per surgeon: None  Labs per anesthesia: None      Instructed pt that they will be notified by the Gi Lab for time of arrival. If any questions please call the GI lab at [phone].    Follow diet and prep instructions per office. May have clear liquids until 2 hours prior to time of arrival.    Bath or shower the night before and the am of surgery with antibacterial soap. No lotions, oils, powders, cologne on skin. No make up, eye make up or jewelry. Wear loose fitting comfortable, clean clothing.     Must have adult present in building the entire time .     Medications for the day of procedure None    The following discharge instructions reviewed with patient: medication given during procedure may cause drowsiness for several hours, therefore, do not drive or operate machinery for remainder of the day, no alcohol on the day of your procedure, resume regular diet and activity unless otherwise directed, for mild sore throat you may use Cepacol throat lozenges or warm salt water gargles as needed, call your physician for any problems or questions. Patient verbalizes understanding.     [id number]

## 2025-04-04 NOTE — PROGRESS NOTES
Left VM with pt regarding scheduled procedure. Informed pt of arrival time,  policy, NPO status and admitting instructions.

## 2025-04-07 ENCOUNTER — TELEPHONE (OUTPATIENT)
Dept: GASTROENTEROLOGY | Age: 45
End: 2025-04-07

## 2025-04-07 ENCOUNTER — HOSPITAL ENCOUNTER (OUTPATIENT)
Age: 45
Discharge: HOME | End: 2025-04-07
Attending: STUDENT IN AN ORGANIZED HEALTH CARE EDUCATION/TRAINING PROGRAM | Admitting: STUDENT IN AN ORGANIZED HEALTH CARE EDUCATION/TRAINING PROGRAM
Payer: COMMERCIAL

## 2025-04-07 ENCOUNTER — ANESTHESIA (OUTPATIENT)
Dept: ENDOSCOPY | Age: 45
End: 2025-04-07
Payer: COMMERCIAL

## 2025-04-07 ENCOUNTER — ANESTHESIA EVENT (OUTPATIENT)
Dept: ENDOSCOPY | Age: 45
End: 2025-04-07
Payer: COMMERCIAL

## 2025-04-07 VITALS
OXYGEN SATURATION: 99 % | HEART RATE: 63 BPM | HEIGHT: 68 IN | WEIGHT: 211 LBS | DIASTOLIC BLOOD PRESSURE: 76 MMHG | RESPIRATION RATE: 15 BRPM | BODY MASS INDEX: 31.98 KG/M2 | SYSTOLIC BLOOD PRESSURE: 121 MMHG | TEMPERATURE: 97.7 F

## 2025-04-07 DIAGNOSIS — K44.9 HIATAL HERNIA: Primary | ICD-10-CM

## 2025-04-07 DIAGNOSIS — R13.10 DYSPHAGIA: ICD-10-CM

## 2025-04-07 PROCEDURE — 2580000003 HC RX 258: Performed by: ANESTHESIOLOGY

## 2025-04-07 PROCEDURE — 3609012700 HC EGD DILATION SAVORY: Performed by: STUDENT IN AN ORGANIZED HEALTH CARE EDUCATION/TRAINING PROGRAM

## 2025-04-07 PROCEDURE — 7100000011 HC PHASE II RECOVERY - ADDTL 15 MIN: Performed by: STUDENT IN AN ORGANIZED HEALTH CARE EDUCATION/TRAINING PROGRAM

## 2025-04-07 PROCEDURE — 3700000000 HC ANESTHESIA ATTENDED CARE: Performed by: STUDENT IN AN ORGANIZED HEALTH CARE EDUCATION/TRAINING PROGRAM

## 2025-04-07 PROCEDURE — C1769 GUIDE WIRE: HCPCS | Performed by: STUDENT IN AN ORGANIZED HEALTH CARE EDUCATION/TRAINING PROGRAM

## 2025-04-07 PROCEDURE — 88305 TISSUE EXAM BY PATHOLOGIST: CPT

## 2025-04-07 PROCEDURE — 88313 SPECIAL STAINS GROUP 2: CPT

## 2025-04-07 PROCEDURE — 2709999900 HC NON-CHARGEABLE SUPPLY: Performed by: STUDENT IN AN ORGANIZED HEALTH CARE EDUCATION/TRAINING PROGRAM

## 2025-04-07 PROCEDURE — 3700000001 HC ADD 15 MINUTES (ANESTHESIA): Performed by: STUDENT IN AN ORGANIZED HEALTH CARE EDUCATION/TRAINING PROGRAM

## 2025-04-07 PROCEDURE — 6360000002 HC RX W HCPCS: Performed by: STUDENT IN AN ORGANIZED HEALTH CARE EDUCATION/TRAINING PROGRAM

## 2025-04-07 PROCEDURE — 7100000010 HC PHASE II RECOVERY - FIRST 15 MIN: Performed by: STUDENT IN AN ORGANIZED HEALTH CARE EDUCATION/TRAINING PROGRAM

## 2025-04-07 PROCEDURE — 6370000000 HC RX 637 (ALT 250 FOR IP): Performed by: STUDENT IN AN ORGANIZED HEALTH CARE EDUCATION/TRAINING PROGRAM

## 2025-04-07 PROCEDURE — 2580000003 HC RX 258: Performed by: STUDENT IN AN ORGANIZED HEALTH CARE EDUCATION/TRAINING PROGRAM

## 2025-04-07 RX ORDER — SODIUM CHLORIDE, SODIUM LACTATE, POTASSIUM CHLORIDE, CALCIUM CHLORIDE 600; 310; 30; 20 MG/100ML; MG/100ML; MG/100ML; MG/100ML
INJECTION, SOLUTION INTRAVENOUS CONTINUOUS
Status: DISCONTINUED | OUTPATIENT
Start: 2025-04-07 | End: 2025-04-07 | Stop reason: HOSPADM

## 2025-04-07 RX ORDER — SODIUM CHLORIDE 0.9 % (FLUSH) 0.9 %
5-40 SYRINGE (ML) INJECTION EVERY 12 HOURS SCHEDULED
Status: DISCONTINUED | OUTPATIENT
Start: 2025-04-07 | End: 2025-04-07 | Stop reason: HOSPADM

## 2025-04-07 RX ORDER — SODIUM CHLORIDE 0.9 % (FLUSH) 0.9 %
5-40 SYRINGE (ML) INJECTION PRN
Status: DISCONTINUED | OUTPATIENT
Start: 2025-04-07 | End: 2025-04-07 | Stop reason: HOSPADM

## 2025-04-07 RX ORDER — SODIUM CHLORIDE 9 MG/ML
25 INJECTION, SOLUTION INTRAVENOUS PRN
Status: DISCONTINUED | OUTPATIENT
Start: 2025-04-07 | End: 2025-04-07 | Stop reason: HOSPADM

## 2025-04-07 RX ORDER — LIDOCAINE HYDROCHLORIDE 20 MG/ML
INJECTION, SOLUTION EPIDURAL; INFILTRATION; INTRACAUDAL; PERINEURAL
Status: DISCONTINUED | OUTPATIENT
Start: 2025-04-07 | End: 2025-04-07 | Stop reason: SDUPTHER

## 2025-04-07 RX ORDER — OMEPRAZOLE 40 MG/1
CAPSULE, DELAYED RELEASE ORAL
Qty: 150 CAPSULE | Refills: 0 | Status: SHIPPED | OUTPATIENT
Start: 2025-04-07 | End: 2025-07-06

## 2025-04-07 RX ORDER — GLYCOPYRROLATE 0.2 MG/ML
INJECTION INTRAMUSCULAR; INTRAVENOUS
Status: DISCONTINUED | OUTPATIENT
Start: 2025-04-07 | End: 2025-04-07 | Stop reason: SDUPTHER

## 2025-04-07 RX ORDER — PROPOFOL 10 MG/ML
INJECTION, EMULSION INTRAVENOUS
Status: DISCONTINUED | OUTPATIENT
Start: 2025-04-07 | End: 2025-04-07 | Stop reason: SDUPTHER

## 2025-04-07 RX ORDER — SODIUM CHLORIDE, SODIUM LACTATE, POTASSIUM CHLORIDE, CALCIUM CHLORIDE 600; 310; 30; 20 MG/100ML; MG/100ML; MG/100ML; MG/100ML
INJECTION, SOLUTION INTRAVENOUS
Status: DISCONTINUED | OUTPATIENT
Start: 2025-04-07 | End: 2025-04-07 | Stop reason: SDUPTHER

## 2025-04-07 RX ORDER — SODIUM CHLORIDE 9 MG/ML
INJECTION, SOLUTION INTRAVENOUS PRN
Status: DISCONTINUED | OUTPATIENT
Start: 2025-04-07 | End: 2025-04-07 | Stop reason: HOSPADM

## 2025-04-07 RX ORDER — LIDOCAINE HYDROCHLORIDE 10 MG/ML
1 INJECTION, SOLUTION INFILTRATION; PERINEURAL
Status: DISCONTINUED | OUTPATIENT
Start: 2025-04-07 | End: 2025-04-07 | Stop reason: HOSPADM

## 2025-04-07 RX ADMIN — GLYCOPYRROLATE 0.2 MG: 0.2 INJECTION INTRAMUSCULAR; INTRAVENOUS at 09:56

## 2025-04-07 RX ADMIN — PROPOFOL 30 MG: 10 INJECTION, EMULSION INTRAVENOUS at 09:59

## 2025-04-07 RX ADMIN — SODIUM CHLORIDE, SODIUM LACTATE, POTASSIUM CHLORIDE, AND CALCIUM CHLORIDE: 600; 310; 30; 20 INJECTION, SOLUTION INTRAVENOUS at 09:40

## 2025-04-07 RX ADMIN — PROPOFOL 70 MG: 10 INJECTION, EMULSION INTRAVENOUS at 09:57

## 2025-04-07 RX ADMIN — SODIUM CHLORIDE, SODIUM LACTATE, POTASSIUM CHLORIDE, AND CALCIUM CHLORIDE: 600; 310; 30; 20 INJECTION, SOLUTION INTRAVENOUS at 09:54

## 2025-04-07 RX ADMIN — LIDOCAINE HYDROCHLORIDE 80 MG: 20 INJECTION, SOLUTION EPIDURAL; INFILTRATION; INTRACAUDAL; PERINEURAL at 09:57

## 2025-04-07 RX ADMIN — PROPOFOL 50 MG: 10 INJECTION, EMULSION INTRAVENOUS at 10:01

## 2025-04-07 RX ADMIN — PROPOFOL 50 MG: 10 INJECTION, EMULSION INTRAVENOUS at 10:06

## 2025-04-07 RX ADMIN — PROPOFOL 50 MG: 10 INJECTION, EMULSION INTRAVENOUS at 10:04

## 2025-04-07 RX ADMIN — BENZOCAINE 1 EACH: 200 SPRAY DENTAL; ORAL; PERIODONTAL at 09:55

## 2025-04-07 ASSESSMENT — PAIN - FUNCTIONAL ASSESSMENT
PAIN_FUNCTIONAL_ASSESSMENT: NONE - DENIES PAIN

## 2025-04-07 NOTE — ANESTHESIA POSTPROCEDURE EVALUATION
Department of Anesthesiology  Postprocedure Note    Patient: Faby Kitchen  MRN: [id number]  YOB: 1980  Date of evaluation: 4/7/2025    Procedure Summary       Date: 04/07/25 Room / Location: [facility] ENDO 04 / [facility] ENDOSCOPY    Anesthesia Start: 0954 Anesthesia Stop: 1014    Procedure: ESOPHAGOGASTRODUODENOSCOPY DILATION SAVORY/BIOPSY (Upper GI Region) Diagnosis:       Dysphagia      (Dysphagia [R13.10])    Surgeons: Sarah Wright MD Responsible Provider: Cordell Greenberg MD    Anesthesia Type: TIVA ASA Status: 2            Anesthesia Type: TIVA    Shivam Phase I: Shivam Score: 10    Shivam Phase II: Shivam Score: 10    Anesthesia Post Evaluation    Patient location during evaluation: bedside  Patient participation: complete - patient participated  Level of consciousness: awake and alert  Airway patency: patent  Nausea & Vomiting: no nausea  Cardiovascular status: hemodynamically stable  Respiratory status: acceptable, nonlabored ventilation and spontaneous ventilation  Hydration status: euvolemic    No notable events documented.

## 2025-04-07 NOTE — ANESTHESIA PRE PROCEDURE
Department of Anesthesiology  Preprocedure Note       Name:  Faby Kitchen   Age:  45 y.o.  :  1980                                          MRN:  [id number]         Date:  2025      Surgeon: Surgeon(s):  Sarah Wright MD    Procedure: Procedure(s):  ESOPHAGOGASTRODUODENOSCOPY DILATATION    Medications prior to admission:   Prior to Admission medications    Medication Sig Start Date End Date Taking? Authorizing Provider   norethindrone-ethinyl estradiol (LOESTRIN FE ) 1-20 MG-MCG per tablet Take 1 tablet by mouth daily  Patient taking differently: Take 1 tablet by mouth nightly 24   Rakel Dotson MD       Current medications:    Current Facility-Administered Medications   Medication Dose Route Frequency Provider Last Rate Last Admin   • lidocaine 1 % injection 1 mL  1 mL IntraDERmal Once PRN Cordell Greenberg MD       • lactated ringers infusion   IntraVENous Continuous Cordell Greenberg MD       • sodium chloride flush 0.9 % injection 5-40 mL  5-40 mL IntraVENous 2 times per day Cordell Greenberg MD       • sodium chloride flush 0.9 % injection 5-40 mL  5-40 mL IntraVENous PRN Cordell Greenberg MD       • 0.9 % sodium chloride infusion   IntraVENous PRN Cordell Greenberg MD       • sodium chloride flush 0.9 % injection 5-40 mL  5-40 mL IntraVENous 2 times per day Sarah Wright MD       • sodium chloride flush 0.9 % injection 5-40 mL  5-40 mL IntraVENous PRN Sarah Wright MD       • 0.9 % sodium chloride infusion  25 mL IntraVENous PRN Sarah Wright MD           Allergies:    Allergies   Allergen Reactions   • Latex Rash     Adhesive tape         Problem List:    Patient Active Problem List   Diagnosis Code   • Encounter for immunization Z23   • Menorrhagia N92.0   • Dysphagia R13.10       Past Medical History:        Diagnosis Date   • Abnormal Pap smear     x2, then normal after retested   • Abnormal Papanicolaou smear of cervix    • Anemia     denies hx of blood

## 2025-04-07 NOTE — TELEPHONE ENCOUNTER
Called pt and left voicemail advising them to return call to schedule follow up office visit with NP, Rakel Burgos per Dr. Wright's procedure note.

## 2025-04-07 NOTE — H&P
Faby Kitchen is 45 y.o. y/o female new patient who presents to our office with complaints of dysphagia. She states she has had this problems for years and had her esophagus dilated about 10 years ago. She cannot remember the reason of why she was having difficulty back then. She said the dilation helped immediately and she hasn't had any trouble until the last 6 months where she notices that food will get stuck mid chest and won't go down. She states she use to be able to walk around to get the food down but now she will have to regurgitate it for the impaction to go away. She went to the ED on 2/26 with a food impaction and GI was contacted, however patient ended up vomiting the impaction up and was able to be discharged. She denies any nausea or vomiting in between the dysphagia, she denies abdominal pain, alcohol or tobacco use, or NSAID use. She does have occasional reflux depending on diet.         PMH/PSH:   Anemia, anxiety, GERD, migraines, hypothyroidism     FMH:   Family history of esophageal strictures  Denies FMH autoimmune disease      SocHx:   Denies smoking  Denies alcohol  Denies illicit drug use     PE:  /79, Pulse 71     Physical Exam:   General appearance - alert, well appearing, and in no distress and oriented to person, place, and time  Mental status - alert, oriented to person, place, and time, normal mood, behavior, speech, dress, motor activity, and thought processes  Heart normal rate and regular rhythm  Abdomen -abdomen is soft without significant tenderness, masses, organomegaly or guarding.  Rectal exam-: deferred, not clinically indicated  Neurologic-Grossly normal:     LABS:         Lab Results   Component Value Date     HGB 14.2 02/25/2025     WBC 8.5 02/25/2025      02/25/2025     MCV 88.3 02/25/2025     IRON 63 07/29/2020     FERRITIN 39 07/29/2020     TIBC 312 01/31/2020     CREATININE 0.99 02/25/2025     ALT 21 03/01/2021     AST 19 03/01/2021         ROS:

## 2025-04-07 NOTE — DISCHARGE INSTRUCTIONS
Gastrointestinal Esophagogastroduodenoscopy (EGD) - Upper Exam Discharge Instructions    1. Call Dr. Wright at [phone] for any problems or questions.    2. Contact the doctor's office for follow up appointment as directed.    3. Medication may cause drowsiness for several hours, therefore:  Do not drive or operate machinery for remainder of the day.    No alcohol today.  Do not make any important or legal decisions for 24 hours.  Do not sign any legal documents for 24 hours.    5. Ordinarily, you may resume regular diet and activity after exam unless otherwise specified by your physician.    6. For mild soreness in your throat you may use Cepacol throat lozenges or warm salt-water gargles as needed.    7. Because of air put into your stomach during exam, you may experience some abdominal distension and nausea, relieved by the passage       gas, for several hours.    Any additional instructions:      Await for pathology letter in the next 1-2 weeks through [employer], if not signed up for [employer], we will mail the results letter to your address. S  tart using omeprazole 40 mg twice daily (30 min before breakfast and dinner) for 8 weeks followed by omeprazole 40 mg daily.   We will arrange you a follow up visit with Rakel in 3-4 weeks.

## 2025-04-16 ENCOUNTER — RESULTS FOLLOW-UP (OUTPATIENT)
Dept: GASTROENTEROLOGY | Age: 45
End: 2025-04-16

## 2025-04-16 ENCOUNTER — TELEPHONE (OUTPATIENT)
Dept: GASTROENTEROLOGY | Age: 45
End: 2025-04-16

## 2025-04-16 NOTE — TELEPHONE ENCOUNTER
I called and informed her about diagnosis of H.pylori and EoE. No change in plan. She has already follow up set up.

## 2025-05-02 ENCOUNTER — TELEPHONE (OUTPATIENT)
Dept: GASTROENTEROLOGY | Age: 45
End: 2025-05-02

## 2025-05-02 DIAGNOSIS — A04.8 H. PYLORI INFECTION: Primary | ICD-10-CM

## 2025-05-02 RX ORDER — CLARITHROMYCIN 500 MG/1
500 TABLET ORAL 2 TIMES DAILY
Qty: 28 TABLET | Refills: 0 | Status: SHIPPED | OUTPATIENT
Start: 2025-05-02 | End: 2025-05-16

## 2025-05-02 RX ORDER — AMOXICILLIN 500 MG/1
1000 CAPSULE ORAL 2 TIMES DAILY
Qty: 56 CAPSULE | Refills: 0 | Status: SHIPPED | OUTPATIENT
Start: 2025-05-02 | End: 2025-05-16

## 2025-05-02 NOTE — TELEPHONE ENCOUNTER
Called patient with results/ recommendations per Dr Wright:    \"I called both numbers to explain EoE but no answer. She needs to use omeprazole 40 BID and follow up with NP/PA in the clinic in 4-6 weeks.     She needs to use omeprazole 40 BID and follow up with NP/PA in the clinic in 4-6 weeks.     Also has H.pylori as below.     We can tell the patient that H.pylori test is positive, this bacteria can cause ulcers or gastritis therefore we need to treat it at this time with two antibiotics and omeprazole. After treatment patient needs repeat H.pylori breath test in 3 months to ensure that bacteria is cleared.     Antibiotics as below if no penicillin allergy:   Amoxicillin 1g BID for 14 days   Clarithromycin 500 mg BID for 14 days     During antibiotic treatment he needs to stay on PPI twice daily (pantoprazole/omeprazole etc.).\"     No answer. Left detailed message regarding diagnosis/ treatment plan. Will send my chart message as well.     Start:  omeprazole (PRILOSEC) 40 MG delayed release capsule,  2 times daily before meals.      clarithromycin (BIAXIN) 500 MG tablet, 2 times daily for 14 days.     amoxicillin (AMOXIL) 500 MG capsule, 2 capsules 2 times daily for 14 days.     Take pantoprazole 30 minutes prior to taking antibiotics in the morning and evening. If the antibiotics are upsetting to your stomach, please take with food. To avoid antibiotic associated diarrhea,  over the counter Florastor or add yogurt with live and active cultures to daily regimen.   You will need to be retested for H. Pylori around  8/2 ,holding PPI for 2 weeks prior to submitting H. PYLORI BREATH TEST.

## 2025-05-21 ENCOUNTER — OFFICE VISIT (OUTPATIENT)
Dept: GASTROENTEROLOGY | Age: 45
End: 2025-05-21
Payer: COMMERCIAL

## 2025-05-21 VITALS
WEIGHT: 208 LBS | SYSTOLIC BLOOD PRESSURE: 120 MMHG | RESPIRATION RATE: 20 BRPM | HEART RATE: 77 BPM | OXYGEN SATURATION: 95 % | HEIGHT: 69 IN | DIASTOLIC BLOOD PRESSURE: 72 MMHG | BODY MASS INDEX: 30.81 KG/M2

## 2025-05-21 DIAGNOSIS — A04.8 H. PYLORI INFECTION: Primary | ICD-10-CM

## 2025-05-21 DIAGNOSIS — K20.0 EOSINOPHILIC ESOPHAGITIS: ICD-10-CM

## 2025-05-21 PROCEDURE — 1036F TOBACCO NON-USER: CPT

## 2025-05-21 PROCEDURE — G8417 CALC BMI ABV UP PARAM F/U: HCPCS

## 2025-05-21 PROCEDURE — 99213 OFFICE O/P EST LOW 20 MIN: CPT

## 2025-05-21 PROCEDURE — G8427 DOCREV CUR MEDS BY ELIG CLIN: HCPCS

## 2025-05-21 NOTE — PROGRESS NOTES
GASTROENTEROLOGY CLINIC VISIT      CC: Follow up on H. Pylori     HPI:   Faby Kitchen is 45 y.o. y/o female established patient who presents to the office to follow up after having an EGD and being diagnosed with H. Pylori and EoE. She did complete 7 days of the H. Pylori regimen but was having an upset stomach so stopped after 7 days. She is feeling better and denies nausea, vomiting, abdominal pain, dysphagia, or GERD. She has continued her Omeprazole 40mg twice daily.      PMH/PSH:   Dysphagia    FMH:   Denies FMH gastric or colorectal cancer   Denies FMH autoimmune disease     SocHx:   Denies smoking  Denies alcohol  Denies illicit drug use    PE:  /72, Pulse 77, Resp 20, SpO2% 95    Physical Exam:   General appearance - alert, well appearing, and in no distress and oriented to person, place, and time  Mental status - alert, oriented to person, place, and time, normal mood, behavior, speech, dress, motor activity, and thought processes  Abdomen -abdomen is soft without significant tenderness, masses, organomegaly or guarding.  Rectal exam-: deferred, not clinically indicated  Neurologic-Grossly normal:    LABS:   Lab Results   Component Value Date    HGB 14.2 02/25/2025    WBC 8.5 02/25/2025     02/25/2025    MCV 88.3 02/25/2025    IRON 63 07/29/2020    FERRITIN 39 07/29/2020    TIBC 312 01/31/2020    CREATININE 0.99 02/25/2025    ALT 21 03/01/2021    AST 19 03/01/2021       ROS:   Review of Systems - History obtained from the patient  General ROS: negative  Gastrointestinal ROS: no abdominal pain, change in bowel habits, or black or bloody stools    Imaging:   None    Endoscopy:   EGD 04/07/2025  Impression: No endoscopic esophageal abnormality to explain patient's dysphagia. Esophagus dilated. Dilated. Normal esophagus. Biopsied. Z-line irregular. Biopsied. Gastritis, characterized by erythema. Biopsied. Normal first portion of the duodenum and second portion of the duodenum. 2 cm

## 2025-07-04 DIAGNOSIS — K44.9 HIATAL HERNIA: ICD-10-CM

## 2025-07-08 ENCOUNTER — TELEPHONE (OUTPATIENT)
Dept: GASTROENTEROLOGY | Age: 45
End: 2025-07-08

## 2025-07-08 RX ORDER — OMEPRAZOLE 40 MG/1
CAPSULE, DELAYED RELEASE ORAL
Qty: 150 CAPSULE | Refills: 0 | OUTPATIENT
Start: 2025-07-08

## 2025-07-08 NOTE — TELEPHONE ENCOUNTER
Called patient in response to [facility] refill request for omeprazole. No answer. LVM advising patient that after she completes her current prescription for omeprazole she will need to wait for 2 weeks and then submit H. Pylori breath test. Once this results refills will be sent for this medication, if appropriate. Left office name/ number for any questions or concerns.

## 2025-07-17 DIAGNOSIS — A04.8 H. PYLORI INFECTION: ICD-10-CM

## 2025-07-18 LAB — UREA BREATH TEST QL: POSITIVE

## 2025-07-21 ENCOUNTER — RESULTS FOLLOW-UP (OUTPATIENT)
Dept: GASTROENTEROLOGY | Age: 45
End: 2025-07-21

## 2025-07-21 RX ORDER — TETRACYCLINE HYDROCHLORIDE 500 MG/1
500 CAPSULE ORAL 4 TIMES DAILY
Qty: 56 CAPSULE | Refills: 0 | Status: SHIPPED | OUTPATIENT
Start: 2025-07-21 | End: 2025-08-04

## 2025-07-21 RX ORDER — METRONIDAZOLE 500 MG/1
500 TABLET ORAL 3 TIMES DAILY
Qty: 42 TABLET | Refills: 0 | Status: SHIPPED | OUTPATIENT
Start: 2025-07-21 | End: 2025-08-04

## 2025-07-21 RX ORDER — OMEPRAZOLE 40 MG/1
40 CAPSULE, DELAYED RELEASE ORAL
Qty: 90 CAPSULE | Refills: 0 | Status: SHIPPED | OUTPATIENT
Start: 2025-07-21

## 2025-07-21 NOTE — RESULT ENCOUNTER NOTE
Spoke to pt and informed. Pt was agreeable and will  this medication. Pt denies any questions at this time.

## (undated) DEVICE — ENDOSCOPIC KIT 1.1+ OP4 CA DE 2 GWN AAMI LEVEL 3

## (undated) DEVICE — KENDALL RADIOLUCENT FOAM MONITORING ELECTRODE RECTANGULAR SHAPE: Brand: KENDALL

## (undated) DEVICE — GOWN,SIRUS,NONRNF,SETINSLV,XL,20/CS: Brand: MEDLINE

## (undated) DEVICE — TROCAR SLEEVE: Brand: KII ® LOW PROFILE SLEEVE

## (undated) DEVICE — BAG SPEC REM 224ML W4XL6IN DIA10MM 1 HND GYN DISP ENDOPCH

## (undated) DEVICE — AIRLIFE™ OXYGEN TUBING 7 FEET (2.1 M) CRUSH RESISTANT OXYGEN TUBING, VINYL TIPPED: Brand: AIRLIFE™

## (undated) DEVICE — MASTISOL ADHESIVE LIQ 2/3ML

## (undated) DEVICE — (D)STRIP SKN CLSR 0.5X4IN WHT --

## (undated) DEVICE — GAUZE,SPONGE,4"X4",12PLY,WOVEN,NS,LF: Brand: MEDLINE

## (undated) DEVICE — LAPAROSCOPIC TROCAR SLEEVE/SINGLE USE: Brand: KII® LOW PROFILE OPTICAL ACCESS SYSTEM

## (undated) DEVICE — GUIDEWIRE WITH SPRING TIP - SINGLE USE: Brand: SAFEGUIDE®

## (undated) DEVICE — CONTAINER FORMALIN PREFILLED 10% NBF 60ML

## (undated) DEVICE — SINGLE PORT MANIFOLD: Brand: NEPTUNE 2

## (undated) DEVICE — SUTURE PDS II SZ 0 L27IN ABSRB VLT L26MM CT-2 1/2 CIR Z334H

## (undated) DEVICE — MOUTHPIECE ENDOSCP L CTRL OPN AND SIDE PORTS DISP

## (undated) DEVICE — SUTURE VCRL SZ 4-0 L27IN ABSRB UD L19MM PS-2 3/8 CIR PRIM J426H

## (undated) DEVICE — APPLIER CLP M/L SHFT DIA5MM 15 LIG LIGAMAX 5

## (undated) DEVICE — CONNECTOR TBNG OD5-7MM O2 END DISP

## (undated) DEVICE — BLOCK BITE AD 60FR W/ VELC STRP ADDRESSES MOST PT AND

## (undated) DEVICE — 2000CC GUARDIAN II: Brand: GUARDIAN

## (undated) DEVICE — DISPOSABLE BIOPSY VALVE MAJ-1555: Brand: SINGLE USE BIOPSY VALVE (STERILE)

## (undated) DEVICE — SOL ANTI-FOG 6ML MEDC -- MEDICHOICE - CONVERT TO 358427

## (undated) DEVICE — BUTTON SWITCH PENCIL BLADE ELECTRODE, HOLSTER: Brand: EDGE

## (undated) DEVICE — SYR 50ML LR LCK 1ML GRAD NSAF --

## (undated) DEVICE — HYPODERMIC SAFETY NEEDLE: Brand: MAGELLAN

## (undated) DEVICE — LAP CHOLE: Brand: MEDLINE INDUSTRIES, INC.

## (undated) DEVICE — [HIGH FLOW INSUFFLATOR,  DO NOT USE IF PACKAGE IS DAMAGED,  KEEP DRY,  KEEP AWAY FROM SUNLIGHT,  PROTECT FROM HEAT AND RADIOACTIVE SOURCES.]: Brand: PNEUMOSURE

## (undated) DEVICE — SHEARS ENDOSCP L36CM DIA5MM ULTRASONIC CRV TIP W/ ADV

## (undated) DEVICE — KENDALL SCD EXPRESS SLEEVES, KNEE LENGTH, LARGE: Brand: KENDALL SCD

## (undated) DEVICE — (D)PREP SKN CHLRAPRP APPL 26ML -- CONVERT TO ITEM 371833

## (undated) DEVICE — REM POLYHESIVE ADULT PATIENT RETURN ELECTRODE: Brand: VALLEYLAB

## (undated) DEVICE — DRAPE TWL SURG 16X26IN BLU ORB04] ALLCARE INC]

## (undated) DEVICE — SOLUTION IRRIG 3000ML 0.9% SOD CHL FLX CONT 0797208] ICU MEDICAL INC]

## (undated) DEVICE — 2, DISPOSABLE SUCTION/IRRIGATOR WITHOUT DISPOSABLE TIP: Brand: STRYKEFLOW

## (undated) DEVICE — SUTURE VCRL SZ 0 L27IN ABSRB UD L26MM CT-2 1/2 CIR J270H

## (undated) DEVICE — FORCEPS BX L240CM JAW DIA2.8MM L CAP W/ NDL MIC MESH TOOTH

## (undated) DEVICE — CONTAINER SPEC FRMLN 120ML --